# Patient Record
Sex: MALE | Race: WHITE | NOT HISPANIC OR LATINO | Employment: OTHER | URBAN - METROPOLITAN AREA
[De-identification: names, ages, dates, MRNs, and addresses within clinical notes are randomized per-mention and may not be internally consistent; named-entity substitution may affect disease eponyms.]

---

## 2017-02-28 ENCOUNTER — ALLSCRIPTS OFFICE VISIT (OUTPATIENT)
Dept: OTHER | Facility: OTHER | Age: 62
End: 2017-02-28

## 2017-02-28 LAB — S PYO AG THROAT QL: NEGATIVE

## 2017-06-06 ENCOUNTER — ALLSCRIPTS OFFICE VISIT (OUTPATIENT)
Dept: OTHER | Facility: OTHER | Age: 62
End: 2017-06-06

## 2017-06-06 DIAGNOSIS — Z12.5 ENCOUNTER FOR SCREENING FOR MALIGNANT NEOPLASM OF PROSTATE: ICD-10-CM

## 2017-06-06 DIAGNOSIS — M85.80 OTHER SPECIFIED DISORDERS OF BONE DENSITY AND STRUCTURE, UNSPECIFIED SITE: ICD-10-CM

## 2017-06-06 DIAGNOSIS — R06.09 OTHER FORMS OF DYSPNEA: ICD-10-CM

## 2017-06-06 DIAGNOSIS — J45.909 UNCOMPLICATED ASTHMA: ICD-10-CM

## 2017-06-06 DIAGNOSIS — Z13.220 ENCOUNTER FOR SCREENING FOR LIPOID DISORDERS: ICD-10-CM

## 2017-06-06 DIAGNOSIS — M54.2 CERVICALGIA: ICD-10-CM

## 2017-06-12 ENCOUNTER — TRANSCRIBE ORDERS (OUTPATIENT)
Dept: ADMINISTRATIVE | Facility: HOSPITAL | Age: 62
End: 2017-06-12

## 2017-06-12 DIAGNOSIS — J45.909 EXTRINSIC ASTHMA, UNSPECIFIED: ICD-10-CM

## 2017-06-12 DIAGNOSIS — R06.09 OTHER DYSPNEA AND RESPIRATORY ABNORMALITY: Primary | ICD-10-CM

## 2017-06-12 DIAGNOSIS — R09.89 OTHER DYSPNEA AND RESPIRATORY ABNORMALITY: Primary | ICD-10-CM

## 2017-06-21 ENCOUNTER — GENERIC CONVERSION - ENCOUNTER (OUTPATIENT)
Dept: OTHER | Facility: OTHER | Age: 62
End: 2017-06-21

## 2017-06-21 LAB
LDLC SERPL CALC-MCNC: 151 MG/DL
PSA (HISTORICAL): 1.3

## 2017-07-27 ENCOUNTER — ALLSCRIPTS OFFICE VISIT (OUTPATIENT)
Dept: OTHER | Facility: OTHER | Age: 62
End: 2017-07-27

## 2017-07-27 DIAGNOSIS — E78.2 MIXED HYPERLIPIDEMIA: ICD-10-CM

## 2018-01-12 VITALS
WEIGHT: 163.38 LBS | HEIGHT: 68 IN | SYSTOLIC BLOOD PRESSURE: 142 MMHG | BODY MASS INDEX: 24.76 KG/M2 | DIASTOLIC BLOOD PRESSURE: 96 MMHG | TEMPERATURE: 98.5 F | OXYGEN SATURATION: 95 % | HEART RATE: 74 BPM

## 2018-01-13 VITALS
HEIGHT: 68 IN | DIASTOLIC BLOOD PRESSURE: 80 MMHG | TEMPERATURE: 98.8 F | OXYGEN SATURATION: 98 % | HEART RATE: 82 BPM | SYSTOLIC BLOOD PRESSURE: 144 MMHG | WEIGHT: 166.38 LBS | BODY MASS INDEX: 25.22 KG/M2

## 2018-01-14 VITALS
TEMPERATURE: 98 F | OXYGEN SATURATION: 98 % | HEIGHT: 68 IN | WEIGHT: 165.6 LBS | SYSTOLIC BLOOD PRESSURE: 134 MMHG | HEART RATE: 69 BPM | DIASTOLIC BLOOD PRESSURE: 80 MMHG | BODY MASS INDEX: 25.1 KG/M2

## 2018-12-05 ENCOUNTER — OFFICE VISIT (OUTPATIENT)
Dept: INTERNAL MEDICINE CLINIC | Age: 63
End: 2018-12-05
Payer: COMMERCIAL

## 2018-12-05 VITALS
BODY MASS INDEX: 26.87 KG/M2 | SYSTOLIC BLOOD PRESSURE: 148 MMHG | HEART RATE: 89 BPM | HEIGHT: 66 IN | WEIGHT: 167.2 LBS | DIASTOLIC BLOOD PRESSURE: 88 MMHG | TEMPERATURE: 98.7 F | OXYGEN SATURATION: 96 %

## 2018-12-05 DIAGNOSIS — Z13.228 SCREENING FOR METABOLIC DISORDER: ICD-10-CM

## 2018-12-05 DIAGNOSIS — J45.21 MILD INTERMITTENT ASTHMA WITH EXACERBATION: ICD-10-CM

## 2018-12-05 DIAGNOSIS — Z11.59 ENCOUNTER FOR HEPATITIS C SCREENING TEST FOR LOW RISK PATIENT: ICD-10-CM

## 2018-12-05 DIAGNOSIS — J06.9 VIRAL URI WITH COUGH: Primary | ICD-10-CM

## 2018-12-05 PROBLEM — J45.901 ASTHMA EXACERBATION: Status: ACTIVE | Noted: 2018-12-05

## 2018-12-05 PROCEDURE — 99213 OFFICE O/P EST LOW 20 MIN: CPT | Performed by: NURSE PRACTITIONER

## 2018-12-05 RX ORDER — PROMETHAZINE HYDROCHLORIDE AND CODEINE PHOSPHATE 6.25; 1 MG/5ML; MG/5ML
5 SYRUP ORAL EVERY 4 HOURS PRN
Qty: 120 ML | Refills: 0 | Status: SHIPPED | OUTPATIENT
Start: 2018-12-05 | End: 2019-11-19

## 2018-12-05 RX ORDER — ALBUTEROL SULFATE 90 UG/1
1 AEROSOL, METERED RESPIRATORY (INHALATION) EVERY 4 HOURS PRN
Qty: 1 INHALER | Refills: 2 | Status: SHIPPED | OUTPATIENT
Start: 2018-12-05 | End: 2019-10-25 | Stop reason: SDUPTHER

## 2018-12-05 RX ORDER — ALBUTEROL SULFATE 90 UG/1
AEROSOL, METERED RESPIRATORY (INHALATION)
COMMUNITY
End: 2018-12-05 | Stop reason: SDUPTHER

## 2018-12-05 NOTE — PROGRESS NOTES
Assessment/Plan:    Asthma exacerbation   Will refill patient's albuterol as well as Advair, patient is to use inhalers as prescribed  Will also advised patient to use Flonase 2 sprays each nostril daily to help with postnasal drip  Viral URI with cough  Illness appears to be viral in nature  Rest and fluids advised  Educated that the course of this illness could be 2-4 weeks  Discussed symptomatic relief, such as warm steam inhalations, tylenol/ibuprofen for fevers and body aches, rest, and drink plenty of fluids  Warm salt gargles for sore throat  Discussed red flag signs to go to the ER, such as chest pain or shortness of breath  Return to the office for reevaluation if symptoms worsen or do not improve in 1-2 weeks    Will give patient promethazine with codeine to help with cough at night  Patient did have an elevation in his blood pressure in office today, advised patient not to take Mucinex DM as it may elevate his blood pressure  Diagnoses and all orders for this visit:    Viral URI with cough  -     fluticasone-salmeterol (ADVAIR DISKUS) 250-50 mcg/dose inhaler; Inhale 1 puff 2 (two) times a day  -     albuterol (PROAIR HFA) 90 mcg/act inhaler; Inhale 1 puff every 4 (four) hours as needed for wheezing or shortness of breath  -     promethazine-codeine (PHENERGAN WITH CODEINE) 6 25-10 mg/5 mL syrup; Take 5 mL by mouth every 4 (four) hours as needed for cough Take at bedtime for cough  Mild intermittent asthma with exacerbation  -     fluticasone-salmeterol (ADVAIR DISKUS) 250-50 mcg/dose inhaler; Inhale 1 puff 2 (two) times a day  -     albuterol (PROAIR HFA) 90 mcg/act inhaler; Inhale 1 puff every 4 (four) hours as needed for wheezing or shortness of breath    Screening for metabolic disorder  -     CBC and differential; Future  -     Comprehensive metabolic panel; Future  -     Lipid panel;  Future    Encounter for hepatitis C screening test for low risk patient  -     Hepatitis C antibody; Future    Other orders  -     Discontinue: fluticasone-salmeterol (ADVAIR DISKUS) 250-50 mcg/dose inhaler; Inhale 1 puff 2 (two) times a day  -     Discontinue: albuterol (PROAIR HFA) 90 mcg/act inhaler; Inhale          Subjective:      Patient ID: Angela Murphy is a 58 y o  male  57 yo male who presents to the office today complaining of worsening cold symptoms x3 days  He reports worsening cough, chest tightness, LAFLEUR, nasal congestion, rhinorrhea, and sinus pressure  He notes a hx of asthma and reports everytime he gets an illness this time of year it triggers his asthma  Pt reports taking robitussin-DM with no relief of sxs; he did take a dose 1 hour prior to arrival to an office  Pt is a former smoker, he quit in 1987  Pt also reports only having a few puffs left of both of his inhalers and requesting refills  Denies any previous hospitalizations in the past for his asthma  Denies any hx of intubations secondary to his asthma  Denies taking prednisone for any asthma flares in the past  He reports he does not like the way he feels when he takes the prednisone  Cough   This is a new problem  The current episode started in the past 7 days  The problem has been unchanged  The problem occurs every few minutes  The cough is non-productive (dry)  Associated symptoms include nasal congestion, postnasal drip, rhinorrhea, shortness of breath (with exertion) and wheezing  Pertinent negatives include no chest pain, chills, ear congestion, ear pain, fever, headaches, rash or sore throat  The symptoms are aggravated by cold air and lying down  Risk factors for lung disease include smoking/tobacco exposure  He has tried OTC cough suppressant and a beta-agonist inhaler (using his inhalers more than usual) for the symptoms  The treatment provided no relief  His past medical history is significant for asthma  There is no history of bronchitis, COPD or emphysema         The following portions of the patient's history were reviewed and updated as appropriate: allergies, current medications, past family history, past medical history, past social history, past surgical history and problem list     Review of Systems   Constitutional: Negative for chills, fatigue and fever  HENT: Positive for congestion, postnasal drip, rhinorrhea and sinus pressure  Negative for ear pain and sore throat  Respiratory: Positive for cough, shortness of breath (with exertion) and wheezing  Cardiovascular: Negative for chest pain  Gastrointestinal: Negative for abdominal pain, constipation, diarrhea, nausea and vomiting  Genitourinary: Negative for difficulty urinating, dysuria, frequency and hematuria  Musculoskeletal: Negative for arthralgias, back pain and neck pain  Skin: Negative for rash and wound  Neurological: Negative for dizziness, syncope, light-headedness and headaches  Psychiatric/Behavioral: Positive for sleep disturbance (secondary to sxs)  Negative for agitation and decreased concentration  The patient is not nervous/anxious  All other systems reviewed and are negative  Past Medical History:   Diagnosis Date    Acute asthmatic bronchitis     Acute frontal sinusitis     History of acute pharyngitis     History of allergy     History of osteopenia     History of right inguinal hernia     History of seborrheic keratosis     History of umbilical hernia     Preop examination          Current Outpatient Prescriptions:     albuterol (PROAIR HFA) 90 mcg/act inhaler, Inhale 1 puff every 4 (four) hours as needed for wheezing or shortness of breath, Disp: 1 Inhaler, Rfl: 2    fluticasone-salmeterol (ADVAIR DISKUS) 250-50 mcg/dose inhaler, Inhale 1 puff 2 (two) times a day, Disp: 1 Inhaler, Rfl: 2    promethazine-codeine (PHENERGAN WITH CODEINE) 6 25-10 mg/5 mL syrup, Take 5 mL by mouth every 4 (four) hours as needed for cough Take at bedtime for cough  , Disp: 120 mL, Rfl: 0    Allergies   Allergen Reactions    Erythromycin GI Intolerance    Pollen Extract        Social History   Past Surgical History:   Procedure Laterality Date    APPENDECTOMY      INGUINAL HERNIA REPAIR      UMBILICAL HERNIA REPAIR       Family History   Problem Relation Age of Onset    Cancer Father         Adenoid Cystic Carcinoma    Heart disease Family     Multiple myeloma Mother     Heart disease Maternal Grandfather        Objective:  /88 (BP Location: Left arm, Patient Position: Sitting, Cuff Size: Adult)   Pulse 89   Temp 98 7 °F (37 1 °C) (Tympanic)   Ht 5' 6 14" (1 68 m)   Wt 75 8 kg (167 lb 3 2 oz)   SpO2 96%   BMI 26 87 kg/m²     No results found for this or any previous visit (from the past 1344 hour(s))  Physical Exam   Constitutional: He is oriented to person, place, and time  He appears well-developed and well-nourished  No distress  HENT:   Head: Normocephalic and atraumatic  Right Ear: External ear normal    Left Ear: External ear normal    Nose: Mucosal edema and rhinorrhea present  Right sinus exhibits no maxillary sinus tenderness and no frontal sinus tenderness  Left sinus exhibits no maxillary sinus tenderness and no frontal sinus tenderness  Mouth/Throat: Mucous membranes are pale and dry  Posterior oropharyngeal erythema present  No oropharyngeal exudate or posterior oropharyngeal edema  Unable to visualize tympanic membranes as patient has cerumen impaction bilateral ears   Eyes: Pupils are equal, round, and reactive to light  Conjunctivae and EOM are normal  Right eye exhibits no discharge  Left eye exhibits no discharge  Neck: Normal range of motion  Neck supple  No thyromegaly present  Cardiovascular: Normal rate, regular rhythm, normal heart sounds and intact distal pulses  Exam reveals no gallop and no friction rub  No murmur heard  Pulmonary/Chest: Effort normal  No stridor  No respiratory distress   He has decreased breath sounds in the right upper field and the left upper field  He has no wheezes  He has no rales  Abdominal: Soft  Bowel sounds are normal  He exhibits no distension  There is no tenderness  Lymphadenopathy:     He has no cervical adenopathy  Neurological: He is alert and oriented to person, place, and time  Skin: Skin is warm and dry  No rash noted  He is not diaphoretic  No erythema  Psychiatric: He has a normal mood and affect   His behavior is normal  Judgment and thought content normal

## 2018-12-05 NOTE — ASSESSMENT & PLAN NOTE
Illness appears to be viral in nature  Rest and fluids advised  Educated that the course of this illness could be 2-4 weeks  Discussed symptomatic relief, such as warm steam inhalations, tylenol/ibuprofen for fevers and body aches, rest, and drink plenty of fluids  Warm salt gargles for sore throat  Discussed red flag signs to go to the ER, such as chest pain or shortness of breath  Return to the office for reevaluation if symptoms worsen or do not improve in 1-2 weeks    Will give patient promethazine with codeine to help with cough at night  Patient did have an elevation in his blood pressure in office today, advised patient not to take Mucinex DM as it may elevate his blood pressure

## 2018-12-05 NOTE — ASSESSMENT & PLAN NOTE
Will refill patient's albuterol as well as Advair, patient is to use inhalers as prescribed  Will also advised patient to use Flonase 2 sprays each nostril daily to help with postnasal drip

## 2019-02-06 LAB
ALBUMIN SERPL-MCNC: 4.4 G/DL (ref 3.6–5.1)
ALBUMIN/GLOB SERPL: 1.8 (CALC) (ref 1–2.5)
ALP SERPL-CCNC: 64 U/L (ref 40–115)
ALT SERPL-CCNC: 14 U/L (ref 9–46)
AST SERPL-CCNC: 14 U/L (ref 10–35)
BASOPHILS # BLD AUTO: 68 CELLS/UL (ref 0–200)
BASOPHILS NFR BLD AUTO: 0.9 %
BILIRUB SERPL-MCNC: 0.6 MG/DL (ref 0.2–1.2)
BUN SERPL-MCNC: 16 MG/DL (ref 7–25)
BUN/CREAT SERPL: ABNORMAL (CALC) (ref 6–22)
CALCIUM SERPL-MCNC: 9.9 MG/DL (ref 8.6–10.3)
CHLORIDE SERPL-SCNC: 103 MMOL/L (ref 98–110)
CHOLEST SERPL-MCNC: 246 MG/DL
CHOLEST/HDLC SERPL: 5.3 (CALC)
CO2 SERPL-SCNC: 30 MMOL/L (ref 20–32)
CREAT SERPL-MCNC: 1.09 MG/DL (ref 0.7–1.25)
EOSINOPHIL # BLD AUTO: 360 CELLS/UL (ref 15–500)
EOSINOPHIL NFR BLD AUTO: 4.8 %
ERYTHROCYTE [DISTWIDTH] IN BLOOD BY AUTOMATED COUNT: 12.4 % (ref 11–15)
GLOBULIN SER CALC-MCNC: 2.4 G/DL (CALC) (ref 1.9–3.7)
GLUCOSE SERPL-MCNC: 102 MG/DL (ref 65–99)
HCT VFR BLD AUTO: 52.7 % (ref 38.5–50)
HCV AB S/CO SERPL IA: 0.01
HCV AB SERPL QL IA: NORMAL
HDLC SERPL-MCNC: 46 MG/DL
HGB BLD-MCNC: 17.6 G/DL (ref 13.2–17.1)
LDLC SERPL CALC-MCNC: 165 MG/DL (CALC)
LYMPHOCYTES # BLD AUTO: 2333 CELLS/UL (ref 850–3900)
LYMPHOCYTES NFR BLD AUTO: 31.1 %
MCH RBC QN AUTO: 30.1 PG (ref 27–33)
MCHC RBC AUTO-ENTMCNC: 33.4 G/DL (ref 32–36)
MCV RBC AUTO: 90.2 FL (ref 80–100)
MONOCYTES # BLD AUTO: 623 CELLS/UL (ref 200–950)
MONOCYTES NFR BLD AUTO: 8.3 %
NEUTROPHILS # BLD AUTO: 4118 CELLS/UL (ref 1500–7800)
NEUTROPHILS NFR BLD AUTO: 54.9 %
NONHDLC SERPL-MCNC: 200 MG/DL (CALC)
PLATELET # BLD AUTO: 289 THOUSAND/UL (ref 140–400)
PMV BLD REES-ECKER: 9.5 FL (ref 7.5–12.5)
POTASSIUM SERPL-SCNC: 5.2 MMOL/L (ref 3.5–5.3)
PROT SERPL-MCNC: 6.8 G/DL (ref 6.1–8.1)
RBC # BLD AUTO: 5.84 MILLION/UL (ref 4.2–5.8)
SL AMB EGFR AFRICAN AMERICAN: 83 ML/MIN/1.73M2
SL AMB EGFR NON AFRICAN AMERICAN: 71 ML/MIN/1.73M2
SODIUM SERPL-SCNC: 141 MMOL/L (ref 135–146)
TRIGL SERPL-MCNC: 195 MG/DL
WBC # BLD AUTO: 7.5 THOUSAND/UL (ref 3.8–10.8)

## 2019-07-09 DIAGNOSIS — J06.9 VIRAL URI WITH COUGH: ICD-10-CM

## 2019-07-09 DIAGNOSIS — J45.21 MILD INTERMITTENT ASTHMA WITH EXACERBATION: ICD-10-CM

## 2019-07-09 NOTE — TELEPHONE ENCOUNTER
MED:  ADVAIR 250-50g  SUPPLY: 90Day  PHARAMCY: [id number]  PATIENT PHONE #: [phone]  LAST OV: 1/26/2018  UPCOMING: [facility] for the patient to call back and reschedule archel

## 2019-10-25 DIAGNOSIS — J45.21 MILD INTERMITTENT ASTHMA WITH EXACERBATION: ICD-10-CM

## 2019-10-25 DIAGNOSIS — J06.9 VIRAL URI WITH COUGH: ICD-10-CM

## 2019-10-25 RX ORDER — ALBUTEROL SULFATE 90 UG/1
1 AEROSOL, METERED RESPIRATORY (INHALATION) EVERY 4 HOURS PRN
Qty: 18 G | Refills: 1 | Status: SHIPPED | OUTPATIENT
Start: 2019-10-25 | End: 2019-11-19 | Stop reason: SDUPTHER

## 2019-11-19 ENCOUNTER — OFFICE VISIT (OUTPATIENT)
Dept: INTERNAL MEDICINE CLINIC | Facility: CLINIC | Age: 64
End: 2019-11-19
Payer: MEDICARE

## 2019-11-19 VITALS
OXYGEN SATURATION: 97 % | HEART RATE: 82 BPM | TEMPERATURE: 98.4 F | HEIGHT: 66 IN | SYSTOLIC BLOOD PRESSURE: 150 MMHG | WEIGHT: 167.6 LBS | DIASTOLIC BLOOD PRESSURE: 82 MMHG | BODY MASS INDEX: 26.93 KG/M2

## 2019-11-19 DIAGNOSIS — J45.20 MILD INTERMITTENT ASTHMA WITHOUT COMPLICATION: ICD-10-CM

## 2019-11-19 DIAGNOSIS — Z13.220 SCREENING CHOLESTEROL LEVEL: ICD-10-CM

## 2019-11-19 DIAGNOSIS — Z12.11 ENCOUNTER FOR SCREENING COLONOSCOPY: Primary | ICD-10-CM

## 2019-11-19 DIAGNOSIS — E78.00 HYPERCHOLESTEREMIA: ICD-10-CM

## 2019-11-19 DIAGNOSIS — Z00.00 WELCOME TO MEDICARE PREVENTIVE VISIT: ICD-10-CM

## 2019-11-19 DIAGNOSIS — J45.21 MILD INTERMITTENT ASTHMA WITH EXACERBATION: ICD-10-CM

## 2019-11-19 DIAGNOSIS — Z13.228 SCREENING FOR METABOLIC DISORDER: ICD-10-CM

## 2019-11-19 DIAGNOSIS — Z12.5 SCREENING PSA (PROSTATE SPECIFIC ANTIGEN): ICD-10-CM

## 2019-11-19 DIAGNOSIS — J30.2 SEASONAL ALLERGIES: ICD-10-CM

## 2019-11-19 DIAGNOSIS — J06.9 VIRAL URI WITH COUGH: ICD-10-CM

## 2019-11-19 DIAGNOSIS — Z12.11 SCREENING FOR COLON CANCER: ICD-10-CM

## 2019-11-19 PROBLEM — J45.901 ASTHMA EXACERBATION: Status: RESOLVED | Noted: 2018-12-05 | Resolved: 2019-11-19

## 2019-11-19 PROCEDURE — G0403 EKG FOR INITIAL PREVENT EXAM: HCPCS | Performed by: FAMILY MEDICINE

## 2019-11-19 PROCEDURE — 99214 OFFICE O/P EST MOD 30 MIN: CPT | Performed by: FAMILY MEDICINE

## 2019-11-19 PROCEDURE — G0402 INITIAL PREVENTIVE EXAM: HCPCS | Performed by: FAMILY MEDICINE

## 2019-11-19 RX ORDER — ALBUTEROL SULFATE 90 UG/1
1 AEROSOL, METERED RESPIRATORY (INHALATION) EVERY 6 HOURS PRN
Qty: 18 G | Refills: 3 | Status: SHIPPED | OUTPATIENT
Start: 2019-11-19 | End: 2021-01-28 | Stop reason: SDUPTHER

## 2019-11-19 RX ORDER — MONTELUKAST SODIUM 10 MG/1
10 TABLET ORAL
Qty: 90 TABLET | Refills: 3 | Status: SHIPPED | OUTPATIENT
Start: 2019-11-19 | End: 2021-01-28

## 2019-11-19 NOTE — PROGRESS NOTES
Assessment/Plan:    No problem-specific Assessment & Plan notes found for this encounter  Problem List Items Addressed This Visit        Respiratory    Mild intermittent asthma without complication    Relevant Medications    fluticasone-salmeterol (ADVAIR DISKUS) 250-50 mcg/dose inhaler    albuterol (PROAIR HFA) 90 mcg/act inhaler    montelukast (SINGULAIR) 10 mg tablet    RESOLVED: Asthma exacerbation    Relevant Medications    fluticasone-salmeterol (ADVAIR DISKUS) 250-50 mcg/dose inhaler    albuterol (PROAIR HFA) 90 mcg/act inhaler    montelukast (SINGULAIR) 10 mg tablet       Other    Screening for metabolic disorder    Relevant Orders    Comprehensive metabolic panel    Lipid panel    Welcome to Medicare preventive visit    Relevant Orders    POCT ECG (Completed)    Comprehensive metabolic panel    Lipid panel    Seasonal allergies      Other Visit Diagnoses     Encounter for screening colonoscopy    -  Primary    Screening for colon cancer        Relevant Orders    Cologuard    Screening PSA (prostate specific antigen)        Relevant Orders    PSA, Total Screen    Screening cholesterol level        Relevant Orders    Comprehensive metabolic panel    Lipid panel    Viral URI with cough        Relevant Medications    fluticasone-salmeterol (ADVAIR DISKUS) 250-50 mcg/dose inhaler    albuterol (PROAIR HFA) 90 mcg/act inhaler    Hypercholesteremia        Relevant Orders    Lipid panel    Comprehensive metabolic panel            Subjective:      Patient ID: Magdalena Russo is a 58 y o  male  HPI     Seasonal allergies, had several outdoor allergies as a child and received injections for several years which did help him  Has been on Singulair in the past but is not sure why he is not on it right now  He did tolerated well  Not taking any over-the-counter allergy medications on a regular basis    Asthma, intermittent, has some wheezing more so from his throat on a regular basis    Only uses Advair once a day even though he knows he is supposed to do it twice a day  Not on antihistamine or mast cell stabilizer  Uses albuterol inhaler every single morning once a day  He is very energetic and works long hours in [employer] and also goes to [employer] and walks as exercise  He does not have baseline shortness of breath  Hyperlipidemia, currently not on medications  His cholesterol does fluctuate from time to time  He does have family history but he eats very well and needs approximately 5-6 very small meals throughout the day on a regular basis            The following portions of the patient's history were reviewed and updated as appropriate: allergies, current medications, past family history, past medical history, past social history, past surgical history and problem list     Review of Systems      Constitutional:  Denies fever or chills   Eyes:  Denies change in visual acuity   HENT:  Denies nasal congestion or sore throat   Respiratory:  Denies cough or shortness of breath or wheezing  Cardiovascular:  Denies palpitations or chest pain  GI:  Denies abdominal pain, nausea, or vomiting  Integument:  Denies rash   Neurologic:  Denies headache or focal weakness        Objective:      /82 (BP Location: Left arm, Patient Position: Sitting, Cuff Size: Large)   Pulse 82   Temp 98 4 °F (36 9 °C) (Oral)   Ht 5' 6 14" (1 68 m)   Wt 76 kg (167 lb 9 6 oz)   SpO2 97%   BMI 26 94 kg/m²          Physical Exam      Constitutional:  Well developed, well nourished, no acute distress, non-toxic appearance   Eyes:  PERRL, conjunctiva normal , non icteric sclera  HENT:  Atraumatic, oropharynx moist  Neck-  supple   Respiratory:  CTA b/l, normal breath sounds, no rales, no wheezing   Cardiovascular:  RRR, no murmurs, no LE edema b/l  GI:  Soft, nondistended, normal bowel sounds x 4, nontender, no organomegaly, no mass, no rebound, no guarding   Neurologic:  no focal deficits noted   Psychiatric:  Speech and behavior appropriate , AAO x 3

## 2019-11-19 NOTE — PROGRESS NOTES
Assessment and Plan:     Problem List Items Addressed This Visit        Other    Screening for metabolic disorder    Relevant Orders    Comprehensive metabolic panel    Lipid panel    Welcome to Medicare preventive visit    Relevant Orders    POCT ECG (Completed)    Comprehensive metabolic panel    Lipid panel      Other Visit Diagnoses     Encounter for screening colonoscopy    -  Primary    Screening for colon cancer        Screening PSA (prostate specific antigen)        Relevant Orders    PSA, Total Screen    Screening cholesterol level        Relevant Orders    Comprehensive metabolic panel    Lipid panel          Preventive health issues were discussed with patient, and age appropriate screening tests were ordered as noted in patient's After Visit Summary  Personalized health advice and appropriate referrals for health education or preventive services given if needed, as noted in patient's After Visit Summary  BMI Counseling: Body mass index is 26 94 kg/m²  The BMI is above normal  Nutrition recommendations include moderation in carbohydrate intake  Exercise recommendations include exercising 3-5 times per week  No pharmacotherapy was ordered  History of Present Illness:     Patient presents for Welcome to Medicare visit       Patient Care Team:  Roxanna Ro DO as PCP - General  NITA Nichole     Review of Systems:     Review of Systems   Problem List:     Patient Active Problem List   Diagnosis    Asthma exacerbation    Screening for metabolic disorder    Encounter for hepatitis C screening test for low risk patient    Welcome to Medicare preventive visit      Past Medical and Surgical History:     Past Medical History:   Diagnosis Date    Acute asthmatic bronchitis     Acute frontal sinusitis     History of acute pharyngitis     History of allergy     History of osteopenia     History of right inguinal hernia     History of seborrheic keratosis     History of umbilical hernia     Preop examination      Past Surgical History:   Procedure Laterality Date    APPENDECTOMY      INGUINAL HERNIA REPAIR      UMBILICAL HERNIA REPAIR        Family History:     Family History   Problem Relation Age of Onset    Cancer Father         Adenoid Cystic Carcinoma    Heart disease Family     Multiple myeloma Mother     Heart disease Maternal Grandfather       Social History:     Social History     Socioeconomic History    Marital status: /Civil Union     Spouse name: None    Number of children: None    Years of education: None    Highest education level: None   Occupational History    None   Social Needs    Financial resource strain: None    Food insecurity:     Worry: None     Inability: None    Transportation needs:     Medical: None     Non-medical: None   Tobacco Use    Smoking status: Former Smoker     Last attempt to quit:      Years since quittin 9    Smokeless tobacco: Never Used   Substance and Sexual Activity    Alcohol use: Yes     Comment: rare    Drug use: No     Comment: Rarely consumes alcohol    Sexual activity: Yes   Lifestyle    Physical activity:     Days per week: None     Minutes per session: None    Stress: None   Relationships    Social connections:     Talks on phone: None     Gets together: None     Attends Cheondo[employer] service: None     Active member of club or organization: None     Attends meetings of clubs or organizations: None     Relationship status: None    Intimate partner violence:     Fear of current or ex partner: None     Emotionally abused: None     Physically abused: None     Forced sexual activity: None   Other Topics Concern    None   Social History Narrative    None      Medications and Allergies:     Current Outpatient Medications   Medication Sig Dispense Refill    albuterol (PROAIR HFA) 90 mcg/act inhaler Inhale 1 puff every 4 (four) hours as needed for wheezing or shortness of breath 18 g 1    fluticasone-salmeterol (ADVAIR DISKUS) 250-50 mcg/dose inhaler Inhale 1 puff 2 (two) times a day 60 each 5     No current facility-administered medications for this visit  Allergies   Allergen Reactions    Erythromycin GI Intolerance    Pollen Extract       Immunizations:     Immunization History   Administered Date(s) Administered    Influenza Quadrivalent, 6-35 Months IM 02/16/2016      Health Maintenance:         Topic Date Due    CRC Screening: Colonoscopy  12/24/2021    Hepatitis C Screening  Completed         Topic Date Due    Pneumococcal Vaccine: Pediatrics (0 to 5 Years) and At-Risk Patients (6 to 59 Years) (1 of 1 - PPSV23) [birth year]/1960    DTaP,Tdap,and Td Vaccines (1 - Tdap) 11/23/1965    Influenza Vaccine  07/01/2019      Medicare Screening Tests and Risk Assessments:     Mathieu Avila is here for his Welcome to Medicare visit  Health Risk Assessment:   Patient rates overall health as very good  Patient feels that their physical health rating is same  Eyesight was rated as same  Hearing was rated as same  Patient feels that their emotional and mental health rating is same  Pain experienced in the last 7 days has been some  Patient's pain rating has been 3/10  Depression Screening:   PHQ-2 Score: 0      Fall Risk Screening: In the past year, patient has experienced: no history of falling in past year      Home Safety:  Patient does not have trouble with stairs inside or outside of their home  Patient has working smoke alarms and has working carbon monoxide detector  Home safety hazards include: none  Nutrition:   Current diet is Regular  Medications:   Patient is currently taking over-the-counter supplements  OTC medications include: see medication list  Patient is able to manage medications       Activities of Daily Living (ADLs)/Instrumental Activities of Daily Living (IADLs):   Walk and transfer into and out of bed and chair?: Yes  Dress and groom yourself?: Yes    Bathe or shower yourself?: Yes    Feed yourself? Yes  Do your laundry/housekeeping?: Yes  Manage your money, pay your bills and track your expenses?: Yes  Make your own meals?: Yes    Do your own shopping?: Yes    Previous Hospitalizations:   Any hospitalizations or ED visits within the last 12 months?: No      Advance Care Planning:   Living will: Yes    Durable POA for healthcare: Yes    Advanced directive: Yes    Advanced directive counseling given: Yes    End of Life Decisions reviewed with patient: Yes    Provider agrees with end of life decisions: Yes      Comments: Wife Millie Damon    Cognitive Screening:   Provider or family/friend/caregiver concerned regarding cognition?: No    PREVENTIVE SCREENINGS      Cardiovascular Screening:    General: Screening Current      Diabetes Screening:     General: Screening Current      Colorectal Cancer Screening:     General: Screening Current      Prostate Cancer Screening:    General: Risks and Benefits Discussed    Due for: PSA      Osteoporosis Screening:    General: Screening Not Indicated      Abdominal Aortic Aneurysm (AAA) Screening:    Risk factors include: tobacco use        Lung Cancer Screening:     General: Screening Not Indicated      Hepatitis C Screening:    General: Screening Current    Other Counseling Topics:   Alcohol use counseling, car/seat belt/driving safety, skin self-exam, sunscreen and regular weightbearing exercise and calcium and vitamin D intake        Visual Acuity Screening    Right eye Left eye Both eyes   Without correction: 50/50 20/40 20/40   With correction:           Physical Exam:     /82 (BP Location: Left arm, Patient Position: Sitting, Cuff Size: Large)   Pulse 82   Temp 98 4 °F (36 9 °C) (Oral)   Ht 5' 6 14" (1 68 m)   Wt 76 kg (167 lb 9 6 oz)   SpO2 97%   BMI 26 94 kg/m²     Physical Exam     Constitutional:  Well developed, well nourished, no acute distress, non-toxic appearance   Eyes:  PERRL, conjunctiva normal , non icteric sclera  HENT:  Atraumatic, oropharynx moist  Neck-  supple   Respiratory:  CTA b/l, normal breath sounds, no rales, no wheezing   Cardiovascular:  RRR, no murmurs, no LE edema b/l  GI:  Soft, nondistended, normal bowel sounds x 4, nontender, no organomegaly, no mass, no rebound, no guarding   Neurologic:  no focal deficits noted   Psychiatric:  Speech and behavior appropriate , AAO x 3    EKG: unchanged from previous tracings        Bobbette Fall, DO

## 2019-11-19 NOTE — PATIENT INSTRUCTIONS

## 2020-01-07 ENCOUNTER — TELEPHONE (OUTPATIENT)
Dept: INTERNAL MEDICINE CLINIC | Facility: CLINIC | Age: 65
End: 2020-01-07

## 2020-11-23 ENCOUNTER — TELEPHONE (OUTPATIENT)
Dept: INTERNAL MEDICINE CLINIC | Age: 65
End: 2020-11-23

## 2020-12-21 DIAGNOSIS — Z13.228 SCREENING FOR METABOLIC DISORDER: ICD-10-CM

## 2020-12-21 DIAGNOSIS — Z12.5 SCREENING PSA (PROSTATE SPECIFIC ANTIGEN): Primary | ICD-10-CM

## 2020-12-21 DIAGNOSIS — E78.00 HYPERCHOLESTEREMIA: ICD-10-CM

## 2020-12-21 DIAGNOSIS — Z13.220 SCREENING CHOLESTEROL LEVEL: ICD-10-CM

## 2021-01-05 ENCOUNTER — LAB (OUTPATIENT)
Dept: LAB | Age: 66
End: 2021-01-05
Payer: MEDICARE

## 2021-01-05 DIAGNOSIS — Z13.228 SCREENING FOR METABOLIC DISORDER: ICD-10-CM

## 2021-01-05 DIAGNOSIS — Z13.220 SCREENING CHOLESTEROL LEVEL: ICD-10-CM

## 2021-01-05 DIAGNOSIS — E78.00 HYPERCHOLESTEREMIA: ICD-10-CM

## 2021-01-05 DIAGNOSIS — Z12.5 SCREENING PSA (PROSTATE SPECIFIC ANTIGEN): ICD-10-CM

## 2021-01-05 LAB
ALBUMIN SERPL BCP-MCNC: 3.8 G/DL (ref 3.5–5)
ALP SERPL-CCNC: 61 U/L (ref 46–116)
ALT SERPL W P-5'-P-CCNC: 28 U/L (ref 12–78)
ANION GAP SERPL CALCULATED.3IONS-SCNC: 2 MMOL/L (ref 4–13)
AST SERPL W P-5'-P-CCNC: 14 U/L (ref 5–45)
BILIRUB SERPL-MCNC: 0.62 MG/DL (ref 0.2–1)
BUN SERPL-MCNC: 16 MG/DL (ref 5–25)
CALCIUM SERPL-MCNC: 8.5 MG/DL (ref 8.3–10.1)
CHLORIDE SERPL-SCNC: 106 MMOL/L (ref 100–108)
CHOLEST SERPL-MCNC: 274 MG/DL (ref 50–200)
CO2 SERPL-SCNC: 32 MMOL/L (ref 21–32)
CREAT SERPL-MCNC: 1.08 MG/DL (ref 0.6–1.3)
GFR SERPL CREATININE-BSD FRML MDRD: 71 ML/MIN/1.73SQ M
GLUCOSE P FAST SERPL-MCNC: 77 MG/DL (ref 65–99)
HDLC SERPL-MCNC: 54 MG/DL
LDLC SERPL CALC-MCNC: 185 MG/DL (ref 0–100)
NONHDLC SERPL-MCNC: 220 MG/DL
POTASSIUM SERPL-SCNC: 4.1 MMOL/L (ref 3.5–5.3)
PROT SERPL-MCNC: 6.9 G/DL (ref 6.4–8.2)
PSA SERPL-MCNC: 1.8 NG/ML (ref 0–4)
SODIUM SERPL-SCNC: 140 MMOL/L (ref 136–145)
TRIGL SERPL-MCNC: 177 MG/DL

## 2021-01-05 PROCEDURE — 36415 COLL VENOUS BLD VENIPUNCTURE: CPT

## 2021-01-05 PROCEDURE — G0103 PSA SCREENING: HCPCS

## 2021-01-05 PROCEDURE — 80061 LIPID PANEL: CPT

## 2021-01-05 PROCEDURE — 80053 COMPREHEN METABOLIC PANEL: CPT

## 2021-01-28 ENCOUNTER — OFFICE VISIT (OUTPATIENT)
Dept: INTERNAL MEDICINE CLINIC | Age: 66
End: 2021-01-28
Payer: MEDICARE

## 2021-01-28 VITALS
TEMPERATURE: 99.1 F | HEART RATE: 97 BPM | OXYGEN SATURATION: 96 % | BODY MASS INDEX: 26.4 KG/M2 | HEIGHT: 67 IN | SYSTOLIC BLOOD PRESSURE: 160 MMHG | WEIGHT: 168.2 LBS | DIASTOLIC BLOOD PRESSURE: 80 MMHG

## 2021-01-28 DIAGNOSIS — Z00.00 MEDICARE ANNUAL WELLNESS VISIT, INITIAL: ICD-10-CM

## 2021-01-28 DIAGNOSIS — E78.2 MIXED HYPERLIPIDEMIA: ICD-10-CM

## 2021-01-28 DIAGNOSIS — L30.9 DERMATITIS: ICD-10-CM

## 2021-01-28 DIAGNOSIS — M94.9 DISORDER OF CARTILAGE, UNSPECIFIED: ICD-10-CM

## 2021-01-28 DIAGNOSIS — L81.9 ATYPICAL PIGMENTED SKIN LESION: ICD-10-CM

## 2021-01-28 DIAGNOSIS — Z82.49 FAMILY HISTORY OF EARLY CAD: ICD-10-CM

## 2021-01-28 DIAGNOSIS — R06.00 DOE (DYSPNEA ON EXERTION): ICD-10-CM

## 2021-01-28 DIAGNOSIS — J45.20 MILD INTERMITTENT ASTHMA WITHOUT COMPLICATION: Primary | ICD-10-CM

## 2021-01-28 DIAGNOSIS — R94.31 ABNORMAL ELECTROCARDIOGRAM (ECG) (EKG): ICD-10-CM

## 2021-01-28 DIAGNOSIS — M85.89 OSTEOPENIA OF MULTIPLE SITES: ICD-10-CM

## 2021-01-28 PROBLEM — R06.09 DOE (DYSPNEA ON EXERTION): Status: ACTIVE | Noted: 2021-01-28

## 2021-01-28 PROCEDURE — 1123F ACP DISCUSS/DSCN MKR DOCD: CPT | Performed by: FAMILY MEDICINE

## 2021-01-28 PROCEDURE — 99214 OFFICE O/P EST MOD 30 MIN: CPT | Performed by: FAMILY MEDICINE

## 2021-01-28 PROCEDURE — G0438 PPPS, INITIAL VISIT: HCPCS | Performed by: FAMILY MEDICINE

## 2021-01-28 RX ORDER — TRIAMCINOLONE ACETONIDE 5 MG/G
CREAM TOPICAL 2 TIMES DAILY PRN
Qty: 30 G | Refills: 4 | Status: SHIPPED | OUTPATIENT
Start: 2021-01-28 | End: 2021-12-07

## 2021-01-28 RX ORDER — DOXYCYCLINE HYCLATE 100 MG
100 TABLET ORAL DAILY
Qty: 10 TABLET | Refills: 0 | Status: SHIPPED | OUTPATIENT
Start: 2021-01-28 | End: 2021-02-07

## 2021-01-28 RX ORDER — ALBUTEROL SULFATE 90 UG/1
1 AEROSOL, METERED RESPIRATORY (INHALATION) EVERY 6 HOURS PRN
Qty: 18 G | Refills: 0 | Status: SHIPPED | OUTPATIENT
Start: 2021-01-28 | End: 2021-07-08 | Stop reason: SDUPTHER

## 2021-01-28 NOTE — PROGRESS NOTES
Assessment/Plan:    Mild intermittent asthma without complication  Well controlled  Pt uses albuterol once in the morning and once at night daily and advair once a day  Labs reviewed with patient  Schedule appt for biopsies  Pt refuses statin, agreeable for fish oil  He states he will do diet changes  Repeat lab sin 6months       Problem List Items Addressed This Visit        Respiratory    Mild intermittent asthma without complication - Primary     Well controlled  Pt uses albuterol once in the morning and once at night daily and advair once a day  Relevant Medications    albuterol (ProAir HFA) 90 mcg/act inhaler    fluticasone-salmeterol (Advair Diskus) 250-50 mcg/dose inhaler       Musculoskeletal and Integument    Dermatitis    Relevant Medications    doxycycline hyclate (VIBRA-TABS) 100 mg tablet    triamcinolone (KENALOG) 0 5 % cream    Atypical pigmented skin lesion    Relevant Medications    triamcinolone (KENALOG) 0 5 % cream       Other    Medicare annual wellness visit, initial    Mixed hyperlipidemia    Relevant Orders    Lipid panel    Comprehensive metabolic panel    LAFLEUR (dyspnea on exertion)    Relevant Orders    Complete PFT with post bronchodilator    NM myocardial perfusion spect (stress and/or rest)    Family history of early CAD    Relevant Orders    NM myocardial perfusion spect (stress and/or rest)      Other Visit Diagnoses     Osteopenia of multiple sites        Relevant Orders    DXA bone density spine hip and pelvis    Vitamin D 25 hydroxy    Disorder of cartilage, unspecified         Relevant Orders    Vitamin D 25 hydroxy    Abnormal electrocardiogram (ECG) (EKG)         Relevant Orders    NM myocardial perfusion spect (stress and/or rest)            Subjective:      Patient ID: Negro Pat is a 76 y o  male  HPI  76year old male presenting for follow up and annual medicare wellness visit  Pt c/o of rash on back x2 weeks   Pt states it is itchy and red - he thought it was acne at first but it hasn't responded to his usual treatment of benzoyl peroxide and alcohol  Pt states it is spreading from his back onto his shoulders now  Pt denies any chest pain, SOB, wheezing, abdominal pain, n/v/d, paresthesias, numbness, extremity edema  Pt states his asthma is under control with his current medication regimen  Pt is aware of his elevated cholesterol, triglycerides, and LDL and attributes it to eating too many baked goods over the holiday season  He states he wants to retake the labs in a few months rather than start any medications for it  Asthma, intermittent, has some wheezing more so from his throat on a regular basis  Only uses Advair once a day even though he knows he is supposed to do it twice a day  Not on antihistamine or mast cell stabilizer  Uses albuterol inhaler every single morning once a day  He is very energetic and works long hours in [employer] and also goes to [employer] and walks as exercise  He does not have baseline shortness of breath      Hyperlipidemia, currently not on medications  His cholesterol does fluctuate from time to time  He does have family history but he eats very well and needs approximately 5-6 very small meals throughout the day on a regular basis  The following portions of the patient's history were reviewed and updated as appropriate: allergies, current medications, past family history, past medical history, past social history, past surgical history and problem list     Review of Systems   Constitutional: Negative for chills, fatigue and fever  HENT: Negative for congestion, ear pain, nosebleeds, rhinorrhea and sore throat  Eyes: Negative for pain and visual disturbance  Respiratory: Negative for cough, chest tightness and shortness of breath  Cardiovascular: Negative for chest pain, palpitations and leg swelling  Gastrointestinal: Negative for abdominal pain, constipation, diarrhea, nausea and vomiting     Endocrine: Negative for polyuria  Genitourinary: Negative for dysuria, flank pain, frequency and hematuria  Musculoskeletal: Positive for arthralgias and myalgias  Skin: Positive for rash (on back x2 weeks,itching, redness)  Neurological: Negative for dizziness, tremors, syncope, weakness, light-headedness, numbness and headaches  Psychiatric/Behavioral: Negative for confusion  Objective:      /80 (BP Location: Left arm, Patient Position: Sitting, Cuff Size: Adult)   Pulse 97   Temp 99 1 °F (37 3 °C) (Temporal)   Ht 5' 7 32" (1 71 m)   Wt 76 3 kg (168 lb 3 2 oz)   SpO2 96%   BMI 26 09 kg/m²          Physical Exam  Constitutional:       Appearance: Normal appearance  HENT:      Head: Normocephalic and atraumatic  Eyes:      Extraocular Movements: Extraocular movements intact  Conjunctiva/sclera: Conjunctivae normal       Pupils: Pupils are equal, round, and reactive to light  Neck:      Musculoskeletal: Normal range of motion and neck supple  Cardiovascular:      Rate and Rhythm: Normal rate and regular rhythm  Pulses: Normal pulses  Heart sounds: Normal heart sounds  Pulmonary:      Effort: Pulmonary effort is normal       Breath sounds: Normal breath sounds  Abdominal:      General: Bowel sounds are normal  There is no distension  Palpations: Abdomen is soft  Tenderness: There is no abdominal tenderness  Musculoskeletal: Normal range of motion  Skin:     General: Skin is warm and dry  Neurological:      General: No focal deficit present  Mental Status: He is alert and oriented to person, place, and time  Psychiatric:         Mood and Affect: Mood normal          Behavior: Behavior normal          Thought Content:  Thought content normal          Judgment: Judgment normal        skin: acne like excoriations on shoulders, upper thorax and upper mid back

## 2021-01-28 NOTE — PROGRESS NOTES
Assessment and Plan:     Problem List Items Addressed This Visit        Respiratory    Mild intermittent asthma without complication - Primary     Well controlled  Pt uses albuterol once in the morning and once at night daily and advair once a day  Other    Medicare annual wellness visit, initial          Flu UTD   PNA x 2 UTD   eye   Dental  PSA UTD   colo ? BMI Counseling: Body mass index is 26 09 kg/m²  The BMI is above normal  Nutrition recommendations include moderation in carbohydrate intake  Exercise recommendations include exercising 3-5 times per week  No pharmacotherapy was ordered  Preventive health issues were discussed with patient, and age appropriate screening tests were ordered as noted in patient's After Visit Summary  Personalized health advice and appropriate referrals for health education or preventive services given if needed, as noted in patient's After Visit Summary       History of Present Illness:     Patient presents for Medicare Annual Wellness visit    Patient Care Team:  Dominique Renee DO as PCP - General  NITA Guy     Problem List:     Patient Active Problem List   Diagnosis    Screening for metabolic disorder    Encounter for hepatitis C screening test for low risk patient    Mild intermittent asthma without complication    Seasonal allergies    Medicare annual wellness visit, initial      Past Medical and Surgical History:     Past Medical History:   Diagnosis Date    Acute asthmatic bronchitis     Acute frontal sinusitis     Allergic     Asthma     History of acute pharyngitis     History of allergy     History of osteopenia     History of right inguinal hernia     History of seborrheic keratosis     History of umbilical hernia     Preop examination      Past Surgical History:   Procedure Laterality Date    APPENDECTOMY      INGUINAL HERNIA REPAIR      UMBILICAL HERNIA REPAIR        Family History:     Family History   Problem Relation Age of Onset    Cancer Father         Adenoid Cystic Carcinoma    Heart disease Family     Multiple myeloma Mother     Heart disease Maternal Grandfather       Social History:        Social History     Socioeconomic History    Marital status: /Civil Union     Spouse name: None    Number of children: None    Years of education: None    Highest education level: None   Occupational History    None   Social Needs    Financial resource strain: None    Food insecurity     Worry: None     Inability: None    Transportation needs     Medical: None     Non-medical: None   Tobacco Use    Smoking status: Former Smoker     Packs/day: 0 00     Years: 0 00     Pack years: 0 00     Quit date:      Years since quittin 0    Smokeless tobacco: Never Used   Substance and Sexual Activity    Alcohol use:  Yes     Alcohol/week: 0 0 standard drinks     Comment: rare    Drug use: No     Comment: Rarely consumes alcohol    Sexual activity: Yes   Lifestyle    Physical activity     Days per week: None     Minutes per session: None    Stress: None   Relationships    Social connections     Talks on phone: None     Gets together: None     Attends Latter day service: None     Active member of club or organization: None     Attends meetings of clubs or organizations: None     Relationship status: None    Intimate partner violence     Fear of current or ex partner: None     Emotionally abused: None     Physically abused: None     Forced sexual activity: None   Other Topics Concern    None   Social History Narrative    None      Medications and Allergies:     Current Outpatient Medications   Medication Sig Dispense Refill    albuterol (PROAIR HFA) 90 mcg/act inhaler Inhale 1 puff every 6 (six) hours as needed for wheezing or shortness of breath 18 g 3    fluticasone-salmeterol (ADVAIR DISKUS) 250-50 mcg/dose inhaler Inhale 1 puff 2 (two) times a day 3 Inhaler 1     No current facility-administered medications for this visit  Allergies   Allergen Reactions    Erythromycin GI Intolerance    Pollen Extract       Immunizations:     Immunization History   Administered Date(s) Administered    INFLUENZA 12/17/2019, 09/28/2020    Influenza Quadrivalent, 6-35 Months IM 02/16/2016    Pneumococcal Conjugate 13-Valent 02/17/2020    Tdap 08/15/2018      Health Maintenance:         Topic Date Due    Colorectal Cancer Screening  12/24/2021    Hepatitis C Screening  Completed     There are no preventive care reminders to display for this patient  Medicare Health Risk Assessment:     /80 (BP Location: Left arm, Patient Position: Sitting, Cuff Size: Adult)   Pulse 97   Temp 99 1 °F (37 3 °C) (Temporal)   Ht 5' 7 32" (1 71 m)   Wt 76 3 kg (168 lb 3 2 oz)   SpO2 96%   BMI 26 09 kg/m²      Baron Ellsworth is here for his Subsequent Wellness visit  Health Risk Assessment:   Patient rates overall health as very good  Patient feels that their physical health rating is same  Eyesight was rated as same  Hearing was rated as same  Patient feels that their emotional and mental health rating is same  Pain experienced in the last 7 days has been a lot  Patient's pain rating has been 5/10  Patient states that he has experienced no weight loss or gain in last 6 months  Depression Screening:   PHQ-2 Score: 0      Fall Risk Screening: In the past year, patient has experienced: history of falling in past year    Number of falls: 1  Injured during fall?: No    Feels unsteady when standing or walking?: No    Worried about falling?: No      Home Safety:  Patient does not have trouble with stairs inside or outside of their home  Patient has working smoke alarms and has working carbon monoxide detector  Home safety hazards include: none  Nutrition:   Current diet is Regular  Medications:   Patient is not currently taking any over-the-counter supplements  Patient is able to manage medications       Activities of Daily Living (ADLs)/Instrumental Activities of Daily Living (IADLs):   Walk and transfer into and out of bed and chair?: Yes  Dress and groom yourself?: Yes    Bathe or shower yourself?: Yes    Feed yourself? Yes  Do your laundry/housekeeping?: Yes  Manage your money, pay your bills and track your expenses?: Yes  Make your own meals?: Yes    Do your own shopping?: Yes    Previous Hospitalizations:   Any hospitalizations or ED visits within the last 12 months?: No      Advance Care Planning:   Living will: Yes    Durable POA for healthcare: Yes    Advanced directive: Yes    Five wishes given: Yes      Comments: His wife skinny    Cognitive Screening:   Provider or family/friend/caregiver concerned regarding cognition?: No    PREVENTIVE SCREENINGS      Cardiovascular Screening:    General: Screening Not Indicated and History Lipid Disorder      Diabetes Screening:     General: Screening Current      Colorectal Cancer Screening:     General: Screening Current      Prostate Cancer Screening:    General: Screening Current      Abdominal Aortic Aneurysm (AAA) Screening:    Risk factors include: age between 73-67 yo and tobacco use        Lung Cancer Screening:     General: Screening Not Indicated      Hepatitis C Screening:    General: Screening Current    Other Counseling Topics:   Alcohol use counseling, car/seat belt/driving safety, skin self-exam, sunscreen and regular weightbearing exercise and calcium and vitamin D intake         Heather Redman DO

## 2021-01-28 NOTE — ASSESSMENT & PLAN NOTE
Well controlled  Pt uses albuterol once in the morning and once at night daily and advair once a day

## 2021-02-24 ENCOUNTER — HOSPITAL ENCOUNTER (OUTPATIENT)
Dept: PULMONOLOGY | Facility: HOSPITAL | Age: 66
Discharge: HOME | End: 2021-02-24
Payer: MEDICARE

## 2021-02-24 DIAGNOSIS — R06.00 DOE (DYSPNEA ON EXERTION): ICD-10-CM

## 2021-02-24 PROCEDURE — 94010 BREATHING CAPACITY TEST: CPT | Performed by: INTERNAL MEDICINE

## 2021-02-24 PROCEDURE — 94760 N-INVAS EAR/PLS OXIMETRY 1: CPT

## 2021-02-24 PROCEDURE — 94729 DIFFUSING CAPACITY: CPT | Performed by: INTERNAL MEDICINE

## 2021-02-24 PROCEDURE — 94010 BREATHING CAPACITY TEST: CPT

## 2021-02-24 PROCEDURE — 94726 PLETHYSMOGRAPHY LUNG VOLUMES: CPT

## 2021-02-24 PROCEDURE — 94726 PLETHYSMOGRAPHY LUNG VOLUMES: CPT | Performed by: INTERNAL MEDICINE

## 2021-02-24 PROCEDURE — 94729 DIFFUSING CAPACITY: CPT

## 2021-03-10 DIAGNOSIS — Z23 ENCOUNTER FOR IMMUNIZATION: ICD-10-CM

## 2021-04-06 ENCOUNTER — PROCEDURE VISIT (OUTPATIENT)
Dept: INTERNAL MEDICINE CLINIC | Age: 66
End: 2021-04-06
Payer: MEDICARE

## 2021-04-06 VITALS
WEIGHT: 166.6 LBS | DIASTOLIC BLOOD PRESSURE: 70 MMHG | SYSTOLIC BLOOD PRESSURE: 148 MMHG | TEMPERATURE: 98.7 F | HEIGHT: 67 IN | OXYGEN SATURATION: 96 % | HEART RATE: 99 BPM | BODY MASS INDEX: 26.15 KG/M2

## 2021-04-06 DIAGNOSIS — L82.1 SEBORRHEIC KERATOSES: ICD-10-CM

## 2021-04-06 DIAGNOSIS — L30.9 DERMATITIS: Primary | ICD-10-CM

## 2021-04-06 DIAGNOSIS — L81.9 ATYPICAL PIGMENTED SKIN LESION: ICD-10-CM

## 2021-04-06 PROCEDURE — 11311 SHAVE SKIN LESION 0.6-1.0 CM: CPT | Performed by: FAMILY MEDICINE

## 2021-04-06 PROCEDURE — 88305 TISSUE EXAM BY PATHOLOGIST: CPT | Performed by: STUDENT IN AN ORGANIZED HEALTH CARE EDUCATION/TRAINING PROGRAM

## 2021-04-06 RX ORDER — PREDNISONE 50 MG/1
50 TABLET ORAL DAILY
Qty: 5 TABLET | Refills: 0 | Status: SHIPPED | OUTPATIENT
Start: 2021-04-06 | End: 2021-04-22

## 2021-04-06 NOTE — PROGRESS NOTES
Shave lesion    Date/Time: 4/6/2021 3:13 PM  Performed by: Pasquale Carter DO  Authorized by: Pasquale Carter DO   Universal Protocol:  Patient understanding: patient states understanding of the procedure being performed  Patient consent: the patient's understanding of the procedure matches consent given      Number of Lesions: 1  Lesion 1:     Body area: head/neck    Head/neck location: forehead (left upper)    Initial size (mm): 12    Final defect size (mm): 6    Malignancy: malignancy unknown      Destruction method: shave removal      Comments:  Post care instructions given to patient    Sample sent for pathology

## 2021-04-22 ENCOUNTER — PROCEDURE VISIT (OUTPATIENT)
Dept: INTERNAL MEDICINE CLINIC | Age: 66
End: 2021-04-22
Payer: MEDICARE

## 2021-04-22 VITALS
DIASTOLIC BLOOD PRESSURE: 72 MMHG | OXYGEN SATURATION: 97 % | SYSTOLIC BLOOD PRESSURE: 148 MMHG | HEART RATE: 88 BPM | WEIGHT: 168 LBS | TEMPERATURE: 98.3 F | HEIGHT: 68 IN | BODY MASS INDEX: 25.46 KG/M2

## 2021-04-22 DIAGNOSIS — L81.9 ATYPICAL PIGMENTED SKIN LESION: Primary | ICD-10-CM

## 2021-04-22 DIAGNOSIS — L82.1 SEBORRHEIC KERATOSES: ICD-10-CM

## 2021-04-22 PROCEDURE — 88305 TISSUE EXAM BY PATHOLOGIST: CPT | Performed by: PATHOLOGY

## 2021-04-22 PROCEDURE — 11311 SHAVE SKIN LESION 0.6-1.0 CM: CPT | Performed by: FAMILY MEDICINE

## 2021-04-22 NOTE — PROGRESS NOTES
Shave lesion    Date/Time: 4/22/2021 3:17 PM  Performed by: Briana Hensley DO  Authorized by: Briana Hensley DO   Universal Protocol:  Risks and benefits: risks, benefits and alternatives were discussed  Consent given by: patient  Patient understanding: patient states understanding of the procedure being performed  Patient identity confirmed: verbally with patient      Number of Lesions: 2  Lesion 1:     Body area: head/neck    Head/neck location: forehead (L temple)    Initial size (mm): 8    Final defect size (mm): 9    Malignancy: malignancy unknown      Destruction method: shave removal    Lesion 2:     Body area: head/neck    Head/neck location: forehead (R temple)    Initial size (mm): 8    Final defect size (mm): 9    Malignancy: malignancy unknown      Destruction method: shave removal      Comments:   Both sample sent for pathology

## 2021-05-11 PROBLEM — L82.1 SEBORRHEIC KERATOSES: Status: ACTIVE | Noted: 2021-05-11

## 2021-07-08 ENCOUNTER — OFFICE VISIT (OUTPATIENT)
Dept: INTERNAL MEDICINE CLINIC | Age: 66
End: 2021-07-08
Payer: MEDICARE

## 2021-07-08 VITALS
HEART RATE: 96 BPM | TEMPERATURE: 98.2 F | BODY MASS INDEX: 25.91 KG/M2 | DIASTOLIC BLOOD PRESSURE: 74 MMHG | HEIGHT: 68 IN | WEIGHT: 171 LBS | SYSTOLIC BLOOD PRESSURE: 132 MMHG | OXYGEN SATURATION: 98 %

## 2021-07-08 DIAGNOSIS — J45.20 MILD INTERMITTENT ASTHMA WITHOUT COMPLICATION: ICD-10-CM

## 2021-07-08 DIAGNOSIS — R06.00 DOE (DYSPNEA ON EXERTION): ICD-10-CM

## 2021-07-08 DIAGNOSIS — Z23 NEED FOR 23-POLYVALENT PNEUMOCOCCAL POLYSACCHARIDE VACCINE: ICD-10-CM

## 2021-07-08 DIAGNOSIS — E78.2 MIXED HYPERLIPIDEMIA: Primary | ICD-10-CM

## 2021-07-08 DIAGNOSIS — Z82.49 FAMILY HISTORY OF EARLY CAD: ICD-10-CM

## 2021-07-08 PROCEDURE — 99214 OFFICE O/P EST MOD 30 MIN: CPT | Performed by: FAMILY MEDICINE

## 2021-07-08 PROCEDURE — G0009 ADMIN PNEUMOCOCCAL VACCINE: HCPCS | Performed by: FAMILY MEDICINE

## 2021-07-08 PROCEDURE — 90732 PPSV23 VACC 2 YRS+ SUBQ/IM: CPT

## 2021-07-08 RX ORDER — ALBUTEROL SULFATE 90 UG/1
1 AEROSOL, METERED RESPIRATORY (INHALATION) EVERY 6 HOURS PRN
Qty: 18 G | Refills: 3 | Status: SHIPPED | OUTPATIENT
Start: 2021-07-08 | End: 2022-06-23

## 2021-07-08 NOTE — PROGRESS NOTES
Assessment/Plan:    1  Mixed hyperlipidemia  -     CT coronary calcium score; Future; Expected date: 07/08/2021    2  Mild intermittent asthma without complication  -     albuterol (ProAir HFA) 90 mcg/act inhaler; Inhale 1 puff every 6 (six) hours as needed for wheezing or shortness of breath    3  LAFLEUR (dyspnea on exertion)  -     CT coronary calcium score; Future; Expected date: 07/08/2021  -     Echo complete with contrast if indicated; Future; Expected date: 07/08/2021    4  Family history of early CAD  -     CT coronary calcium score; Future; Expected date: 07/08/2021  -     Echo complete with contrast if indicated; Future; Expected date: 07/08/2021    5  Need for 23-polyvalent pneumococcal polysaccharide vaccine  -     PNEUMOCOCCAL POLYSACCHARIDE VACCINE 23-VALENT =>3YO SQ IM            There are no Patient Instructions on file for this visit  Return in about 8 weeks (around 9/2/2021) for Recheck  Subjective:      Patient ID: Tessa Castillo is a 76 y o  male  Chief Complaint   Patient presents with    Follow-up     6 month fu - PFT 1/28/21- asthma        HPI  Asthma, intermittent, has some wheezing more so from his throat on a regular basis  [employer] uses Advair once a day even though he knows he is supposed to do it twice a day   Not on antihistamine or mast cell stabilizer   Uses albuterol inhaler every single morning once a day  Cyndy Kurtz is very energetic and works long hours in [employer] and also goes to [employer] and walks as exercise   He does not have baseline shortness of breath  July 2021, had PFTs done in January which showed FEV1 of 56%  At 1 point he has severe asthma and was on Advair 500 mg and currently is on 250  His FEV1 to use to be 46% according to him  His last PFT was done many many years ago    No recent exacerbations requiring ER visits hospitalizations or prednisone     Hyperlipidemia, currently not on medications   His cholesterol does fluctuate from time to time  Cyndy Kurtz does have family history but he eats very well and needs approximately 5-6 very small meals throughout the day on a regular basis  July 2021, last LDL remains elevated  Patient not on statin and did not get his stress test as recommended  He was concerned with the dobutamine and is asthma  He is requesting alternative testing  He has significant early family history in his father who had 3 acute MI eyes and then underwent chelation therapy which was beneficial to him  Patient occasionally has intermittent chest discomfort but nothing that lasts or makes him worried to go to the emergency room  He is very active and works and is yard     The following portions of the patient's history were reviewed and updated as appropriate: allergies, current medications, past family history, past medical history, past social history, past surgical history and problem list     Review of Systems      Constitutional:  Denies fever or chills , + weight gain  Eyes:  Denies double or blurry vision  HENT:  Denies nasal congestion or sore throat   Respiratory:  Denies cough or shortness of breath or wheezing  Cardiovascular:  Denies palpitations or chest pain  GI:  Denies abdominal pain, nausea, or vomiting, no loose stools, no reflux  Integument:  Denies rash , no open areas  Neurologic:  Denies headache or focal weakness, no dizziness  : no dysuria      Current Outpatient Medications   Medication Sig Dispense Refill    albuterol (ProAir HFA) 90 mcg/act inhaler Inhale 1 puff every 6 (six) hours as needed for wheezing or shortness of breath 18 g 3    fluticasone-salmeterol (Advair Diskus) 250-50 mcg/dose inhaler Inhale 1 puff 2 (two) times a day 3 Inhaler 1    triamcinolone (KENALOG) 0 5 % cream Apply topically 2 (two) times a day as needed for irritation or rash 30 g 4     No current facility-administered medications for this visit         Objective:    /74 (BP Location: Left arm, Patient Position: Sitting, Cuff Size: Standard) Pulse 96   Temp 98 2 °F (36 8 °C) (Temporal)   Ht 5' 7 52" (1 715 m)   Wt 77 6 kg (171 lb)   SpO2 98%   BMI 26 37 kg/m²        Physical Exam       Constitutional:  Well developed, well nourished, no acute distress, non-toxic appearance   Eyes:  PERRL, conjunctiva normal , non icteric sclera  HENT:  Atraumatic, oropharynx moist  Neck-  supple   Respiratory:  CTA b/l, normal breath sounds, no rales, no wheezing   Cardiovascular:  RRR, no murmurs, no LE edema b/l  GI:  Soft, nondistended, normal bowel sounds x 4, nontender, no organomegaly, no mass, no rebound, no guarding   Neurologic:  no focal deficits noted   Psychiatric:  Speech and behavior appropriate , AAO x 3        Lara Mesa DO

## 2021-07-20 ENCOUNTER — HOSPITAL ENCOUNTER (OUTPATIENT)
Dept: NON INVASIVE DIAGNOSTICS | Facility: CLINIC | Age: 66
Discharge: HOME | End: 2021-07-20
Payer: MEDICARE

## 2021-07-20 DIAGNOSIS — R06.00 DOE (DYSPNEA ON EXERTION): ICD-10-CM

## 2021-07-20 DIAGNOSIS — Z82.49 FAMILY HISTORY OF EARLY CAD: ICD-10-CM

## 2021-07-20 PROCEDURE — 93306 TTE W/DOPPLER COMPLETE: CPT

## 2021-07-20 PROCEDURE — 93306 TTE W/DOPPLER COMPLETE: CPT | Performed by: INTERNAL MEDICINE

## 2021-08-01 ENCOUNTER — HOSPITAL ENCOUNTER (OUTPATIENT)
Dept: CT IMAGING | Facility: HOSPITAL | Age: 66
Discharge: HOME | End: 2021-08-01
Payer: COMMERCIAL

## 2021-08-01 DIAGNOSIS — E78.2 MIXED HYPERLIPIDEMIA: ICD-10-CM

## 2021-08-01 DIAGNOSIS — R06.00 DOE (DYSPNEA ON EXERTION): ICD-10-CM

## 2021-08-01 DIAGNOSIS — Z82.49 FAMILY HISTORY OF EARLY CAD: ICD-10-CM

## 2021-08-01 PROCEDURE — 75571 CT HRT W/O DYE W/CA TEST: CPT

## 2021-08-01 PROCEDURE — G1004 CDSM NDSC: HCPCS

## 2021-09-01 ENCOUNTER — APPOINTMENT (OUTPATIENT)
Dept: LAB | Age: 66
End: 2021-09-01
Payer: MEDICARE

## 2021-09-01 DIAGNOSIS — J45.20 MILD INTERMITTENT ASTHMA WITHOUT COMPLICATION: ICD-10-CM

## 2021-09-01 DIAGNOSIS — E78.2 MIXED HYPERLIPIDEMIA: ICD-10-CM

## 2021-09-01 DIAGNOSIS — M94.9 DISORDER OF CARTILAGE, UNSPECIFIED: ICD-10-CM

## 2021-09-01 DIAGNOSIS — L30.9 DERMATITIS: ICD-10-CM

## 2021-09-01 DIAGNOSIS — M85.89 OSTEOPENIA OF MULTIPLE SITES: ICD-10-CM

## 2021-09-01 LAB
25(OH)D3 SERPL-MCNC: 69.1 NG/ML (ref 30–100)
ALBUMIN SERPL BCP-MCNC: 3.5 G/DL (ref 3.5–5)
ALP SERPL-CCNC: 66 U/L (ref 46–116)
ALT SERPL W P-5'-P-CCNC: 27 U/L (ref 12–78)
ANION GAP SERPL CALCULATED.3IONS-SCNC: 0 MMOL/L (ref 4–13)
AST SERPL W P-5'-P-CCNC: 18 U/L (ref 5–45)
BASOPHILS # BLD AUTO: 0.11 THOUSANDS/ΜL (ref 0–0.1)
BASOPHILS NFR BLD AUTO: 1 % (ref 0–1)
BILIRUB SERPL-MCNC: 0.52 MG/DL (ref 0.2–1)
BUN SERPL-MCNC: 17 MG/DL (ref 5–25)
CALCIUM SERPL-MCNC: 8.6 MG/DL (ref 8.3–10.1)
CHLORIDE SERPL-SCNC: 109 MMOL/L (ref 100–108)
CHOLEST SERPL-MCNC: 228 MG/DL (ref 50–200)
CO2 SERPL-SCNC: 31 MMOL/L (ref 21–32)
CREAT SERPL-MCNC: 1.14 MG/DL (ref 0.6–1.3)
CRP SERPL QL: <3 MG/L
EOSINOPHIL # BLD AUTO: 0.49 THOUSAND/ΜL (ref 0–0.61)
EOSINOPHIL NFR BLD AUTO: 6 % (ref 0–6)
ERYTHROCYTE [DISTWIDTH] IN BLOOD BY AUTOMATED COUNT: 12.3 % (ref 11.6–15.1)
GFR SERPL CREATININE-BSD FRML MDRD: 67 ML/MIN/1.73SQ M
GLUCOSE P FAST SERPL-MCNC: 81 MG/DL (ref 65–99)
HCT VFR BLD AUTO: 53 % (ref 36.5–49.3)
HDLC SERPL-MCNC: 48 MG/DL
HGB BLD-MCNC: 17.3 G/DL (ref 12–17)
IMM GRANULOCYTES # BLD AUTO: 0.02 THOUSAND/UL (ref 0–0.2)
IMM GRANULOCYTES NFR BLD AUTO: 0 % (ref 0–2)
LDLC SERPL CALC-MCNC: 142 MG/DL (ref 0–100)
LYMPHOCYTES # BLD AUTO: 2.01 THOUSANDS/ΜL (ref 0.6–4.47)
LYMPHOCYTES NFR BLD AUTO: 25 % (ref 14–44)
MCH RBC QN AUTO: 31.1 PG (ref 26.8–34.3)
MCHC RBC AUTO-ENTMCNC: 32.6 G/DL (ref 31.4–37.4)
MCV RBC AUTO: 95 FL (ref 82–98)
MONOCYTES # BLD AUTO: 0.83 THOUSAND/ΜL (ref 0.17–1.22)
MONOCYTES NFR BLD AUTO: 10 % (ref 4–12)
NEUTROPHILS # BLD AUTO: 4.54 THOUSANDS/ΜL (ref 1.85–7.62)
NEUTS SEG NFR BLD AUTO: 58 % (ref 43–75)
NONHDLC SERPL-MCNC: 180 MG/DL
NRBC BLD AUTO-RTO: 0 /100 WBCS
PLATELET # BLD AUTO: 268 THOUSANDS/UL (ref 149–390)
PMV BLD AUTO: 9.2 FL (ref 8.9–12.7)
POTASSIUM SERPL-SCNC: 4.4 MMOL/L (ref 3.5–5.3)
PROT SERPL-MCNC: 6.9 G/DL (ref 6.4–8.2)
RBC # BLD AUTO: 5.57 MILLION/UL (ref 3.88–5.62)
SODIUM SERPL-SCNC: 140 MMOL/L (ref 136–145)
TRIGL SERPL-MCNC: 190 MG/DL
WBC # BLD AUTO: 8 THOUSAND/UL (ref 4.31–10.16)

## 2021-09-01 PROCEDURE — 36415 COLL VENOUS BLD VENIPUNCTURE: CPT

## 2021-09-01 PROCEDURE — 80061 LIPID PANEL: CPT

## 2021-09-01 PROCEDURE — 80053 COMPREHEN METABOLIC PANEL: CPT

## 2021-09-01 PROCEDURE — 82306 VITAMIN D 25 HYDROXY: CPT

## 2021-09-01 PROCEDURE — 85025 COMPLETE CBC W/AUTO DIFF WBC: CPT

## 2021-09-01 PROCEDURE — 86140 C-REACTIVE PROTEIN: CPT

## 2021-09-07 ENCOUNTER — OFFICE VISIT (OUTPATIENT)
Dept: INTERNAL MEDICINE CLINIC | Facility: CLINIC | Age: 66
End: 2021-09-07
Payer: MEDICARE

## 2021-09-07 VITALS
WEIGHT: 173.6 LBS | DIASTOLIC BLOOD PRESSURE: 84 MMHG | OXYGEN SATURATION: 98 % | BODY MASS INDEX: 26.31 KG/M2 | HEART RATE: 80 BPM | TEMPERATURE: 98.8 F | HEIGHT: 68 IN | SYSTOLIC BLOOD PRESSURE: 158 MMHG

## 2021-09-07 DIAGNOSIS — J31.0 CHRONIC RHINITIS: ICD-10-CM

## 2021-09-07 DIAGNOSIS — J30.2 OTHER SEASONAL ALLERGIC RHINITIS: ICD-10-CM

## 2021-09-07 DIAGNOSIS — R07.89 OTHER CHEST PAIN: ICD-10-CM

## 2021-09-07 DIAGNOSIS — E78.2 MIXED HYPERLIPIDEMIA: Primary | ICD-10-CM

## 2021-09-07 DIAGNOSIS — J45.20 MILD INTERMITTENT ASTHMA WITHOUT COMPLICATION: ICD-10-CM

## 2021-09-07 DIAGNOSIS — Z12.5 SCREENING PSA (PROSTATE SPECIFIC ANTIGEN): ICD-10-CM

## 2021-09-07 DIAGNOSIS — Z82.49 FAMILY HISTORY OF EARLY CAD: ICD-10-CM

## 2021-09-07 DIAGNOSIS — R06.00 DOE (DYSPNEA ON EXERTION): ICD-10-CM

## 2021-09-07 PROCEDURE — 99214 OFFICE O/P EST MOD 30 MIN: CPT | Performed by: FAMILY MEDICINE

## 2021-09-07 RX ORDER — MONTELUKAST SODIUM 10 MG/1
10 TABLET ORAL
Qty: 90 TABLET | Refills: 1 | Status: SHIPPED | OUTPATIENT
Start: 2021-09-07 | End: 2021-12-07

## 2021-09-07 NOTE — PROGRESS NOTES
Assessment/Plan:    1  Mixed hyperlipidemia  -     Lipid panel; Future  -     Comprehensive metabolic panel; Future  -     [facility] Allergy Panel, Adult; Future  -     Stress test only, exercise; Future; Expected date: 09/07/2021    2  Mild intermittent asthma without complication  -     montelukast (SINGULAIR) 10 mg tablet; Take 1 tablet (10 mg total) by mouth daily at bedtime    3  Family history of early CAD  -     Stress test only, exercise; Future; Expected date: 09/07/2021    4  Screening PSA (prostate specific antigen)  -     PSA, Total Screen; Future    5  Other seasonal allergic rhinitis   -     [facility] Allergy Panel, Adult; Future    6  Chronic rhinitis   -     [facility] Allergy Panel, Adult; Future    7  Other chest pain  -     Stress test only, exercise; Future; Expected date: 09/07/2021    8  LAFLEUR (dyspnea on exertion)      BMI Counseling: Body mass index is 26 4 kg/m²  The BMI is above normal  Nutrition recommendations include moderation in carbohydrate intake  Exercise recommendations include exercising 3-5 times per week  No pharmacotherapy was ordered  Recommend ASA 81 mg daily, complete stress test, changed to exercise stress test per patient request    There are no Patient Instructions on file for this visit  Return in about 6 months (around 3/7/2022) for Recheck  Subjective:      Patient ID: Festus White is a 76 y o  male  Chief Complaint   Patient presents with    Follow-up     Follow up test results  Labs done 9/1/21 and ECHO/CT scan   Health Maintenance     BMI follow up due 2/15/22          HPI      Asthma, intermittent, has some wheezing more so from his throat on a regular basis  Carey Martinez uses Advair once a day even though he knows he is supposed to do it twice a day   Not on antihistamine or mast cell stabilizer   Uses albuterol inhaler every single morning once a day  [facility] is very energetic and works long hours in [employer] and also goes to [employer] and walks as exercise   He does not have baseline shortness of breath  July 2021, had PFTs done in January which showed FEV1 of 56%  At 1 point he has severe asthma and was on Advair 500 mg and currently is on 250  His FEV1 to use to be 46% according to him  His last PFT was done many many years ago  No recent exacerbations requiring ER visits hospitalizations or prednisone  Sept 2021: stable, no exacerbations, has had LAFLEUR with heavy lifting in the yard      Hyperlipidemia, currently not on medications   His cholesterol does fluctuate from time to time  [facility] does have family history but he eats very well and needs approximately 5-6 very small meals throughout the day on a regular basis  July 2021, last LDL remains elevated  Patient not on statin and did not get his stress test as recommended  He was concerned with the dobutamine and is asthma  He is requesting alternative testing  He has significant early family history in his father who had 3 acute MI eyes and then underwent chelation therapy which was beneficial to him  Patient occasionally has intermittent chest discomfort but nothing that lasts or makes him worried to go to the emergency room    He is very active and works and is yard  [facility] 2021: had labs done, improved lipids, not on meds, Ct calcium score Is resulted he is here for results      Chest pain:  Intermittent CP with exertion, resolves with aspirin, was recommended stress test but never scheduled    The following portions of the patient's history were reviewed and updated as appropriate: allergies, current medications, past family history, past medical history, past social history, past surgical history and problem list     Review of Systems      Constitutional:  Denies fever or chills   Eyes:  Denies double or blurry vision  HENT:  Denies nasal congestion or sore throat   Respiratory:  Denies cough or shortness of breath or wheezing  Cardiovascular:  Denies palpitations or chest pain  GI:  Denies abdominal pain, nausea, or vomiting, no loose stools, no reflux  Integument:  Denies rash , no open areas  Neurologic:  Denies headache or focal weakness, no dizziness  : no dysuria      Current Outpatient Medications   Medication Sig Dispense Refill    albuterol (ProAir HFA) 90 mcg/act inhaler Inhale 1 puff every 6 (six) hours as needed for wheezing or shortness of breath 18 g 3    fluticasone-salmeterol (Advair Diskus) 250-50 mcg/dose inhaler Inhale 1 puff 2 (two) times a day 3 Inhaler 1    triamcinolone (KENALOG) 0 5 % cream Apply topically 2 (two) times a day as needed for irritation or rash 30 g 4    montelukast (SINGULAIR) 10 mg tablet Take 1 tablet (10 mg total) by mouth daily at bedtime 90 tablet 1     No current facility-administered medications for this visit         Objective:    /84 (BP Location: Right arm, Patient Position: Sitting, Cuff Size: Adult)   Pulse 80   Temp 98 8 °F (37 1 °C) (Tympanic)   Ht 5' 8" (1 727 m)   Wt 78 7 kg (173 lb 9 6 oz)   SpO2 98% Comment: Room air  BMI 26 40 kg/m²        Physical Exam       Constitutional:  Well developed, well nourished, no acute distress, non-toxic appearance   Eyes:  PERRL, conjunctiva normal , non icteric sclera  HENT:  Atraumatic, oropharynx moist  Neck-  supple   Respiratory:  CTA b/l, normal breath sounds, no rales, no wheezing   Cardiovascular:  RRR, no murmurs, no LE edema b/l  GI:  Soft, nondistended, normal bowel sounds x 4, nontender, no organomegaly, no mass, no rebound, no guarding   Neurologic:  no focal deficits noted   Psychiatric:  Speech and behavior appropriate , AAO x 3        Manuela Lopes DO

## 2021-09-20 ENCOUNTER — HOSPITAL ENCOUNTER (OUTPATIENT)
Dept: NON INVASIVE DIAGNOSTICS | Facility: CLINIC | Age: 66
Discharge: HOME | End: 2021-09-20
Payer: MEDICARE

## 2021-09-20 DIAGNOSIS — Z82.49 FAMILY HISTORY OF EARLY CAD: ICD-10-CM

## 2021-09-20 DIAGNOSIS — E78.2 MIXED HYPERLIPIDEMIA: ICD-10-CM

## 2021-09-20 DIAGNOSIS — R07.89 OTHER CHEST PAIN: ICD-10-CM

## 2021-09-20 PROCEDURE — 93017 CV STRESS TEST TRACING ONLY: CPT

## 2021-09-20 PROCEDURE — 93016 CV STRESS TEST SUPVJ ONLY: CPT | Performed by: INTERNAL MEDICINE

## 2021-09-20 PROCEDURE — 93018 CV STRESS TEST I&R ONLY: CPT | Performed by: INTERNAL MEDICINE

## 2021-09-21 LAB
ARRHY DURING EX: NORMAL
CHEST PAIN STATEMENT: NORMAL
MAX DIASTOLIC BP: 100 MMHG
MAX HEART RATE: 148 BPM
MAX PREDICTED HEART RATE: 154 BPM
MAX. SYSTOLIC BP: 170 MMHG
PROTOCOL NAME: NORMAL
REASON FOR TERMINATION: NORMAL
TARGET HR FORMULA: NORMAL
TEST INDICATION: NORMAL
TIME IN EXERCISE PHASE: NORMAL

## 2021-11-10 ENCOUNTER — OFFICE VISIT (OUTPATIENT)
Dept: CARDIOLOGY CLINIC | Facility: CLINIC | Age: 66
End: 2021-11-10
Payer: MEDICARE

## 2021-11-10 VITALS
HEIGHT: 68 IN | WEIGHT: 176 LBS | SYSTOLIC BLOOD PRESSURE: 164 MMHG | DIASTOLIC BLOOD PRESSURE: 98 MMHG | HEART RATE: 86 BPM | BODY MASS INDEX: 26.67 KG/M2

## 2021-11-10 DIAGNOSIS — Z82.49 FAMILY HISTORY OF EARLY CAD: ICD-10-CM

## 2021-11-10 DIAGNOSIS — R07.89 OTHER CHEST PAIN: Primary | ICD-10-CM

## 2021-11-10 DIAGNOSIS — E78.2 MIXED HYPERLIPIDEMIA: ICD-10-CM

## 2021-11-10 DIAGNOSIS — R06.00 DOE (DYSPNEA ON EXERTION): ICD-10-CM

## 2021-11-10 DIAGNOSIS — R94.39 ABNORMAL STRESS TEST: ICD-10-CM

## 2021-11-10 PROCEDURE — 99204 OFFICE O/P NEW MOD 45 MIN: CPT | Performed by: INTERNAL MEDICINE

## 2021-11-10 PROCEDURE — 93000 ELECTROCARDIOGRAM COMPLETE: CPT | Performed by: INTERNAL MEDICINE

## 2021-11-10 RX ORDER — ATORVASTATIN CALCIUM 40 MG/1
40 TABLET, FILM COATED ORAL DAILY
Qty: 30 TABLET | Refills: 3 | Status: SHIPPED | OUTPATIENT
Start: 2021-11-10

## 2021-11-12 ENCOUNTER — IMMUNIZATIONS (OUTPATIENT)
Dept: FAMILY MEDICINE CLINIC | Facility: HOSPITAL | Age: 66
End: 2021-11-12

## 2021-11-12 DIAGNOSIS — Z23 ENCOUNTER FOR IMMUNIZATION: Primary | ICD-10-CM

## 2021-11-12 PROCEDURE — 91306 COVID-19 MODERNA VACC 0.25 ML BOOSTER: CPT

## 2021-11-12 PROCEDURE — 0013A COVID-19 MODERNA VACC 0.25 ML BOOSTER: CPT

## 2021-11-18 ENCOUNTER — HOSPITAL ENCOUNTER (OUTPATIENT)
Dept: NON INVASIVE DIAGNOSTICS | Facility: CLINIC | Age: 66
Discharge: HOME | End: 2021-11-18
Payer: MEDICARE

## 2021-11-18 DIAGNOSIS — Z82.49 FAMILY HISTORY OF EARLY CAD: ICD-10-CM

## 2021-11-18 DIAGNOSIS — R07.89 OTHER CHEST PAIN: ICD-10-CM

## 2021-11-18 DIAGNOSIS — R06.00 DOE (DYSPNEA ON EXERTION): ICD-10-CM

## 2021-11-18 DIAGNOSIS — E78.2 MIXED HYPERLIPIDEMIA: ICD-10-CM

## 2021-11-18 LAB
BASELINE ST DEPRESSION: 0 MM
MAX HR PERCENT: 93 %
MAX HR: 131 BPM
NUC STRESS EJECTION FRACTION: 78 %
RATE PRESSURE PRODUCT: NORMAL
SL CV REST NUCLEAR ISOTOPE DOSE: 10.58 MCI
SL CV STRESS NUCLEAR ISOTOPE DOSE: 32.2 MCI
SL CV STRESS RECOVERY BP: NORMAL MMHG
SL CV STRESS RECOVERY HR: 93 BPM
SL CV STRESS RECOVERY O2 SAT: 96 %
SL CV STRESS STAGE REACHED: 2
STRESS ANGINA INDEX: 1
STRESS BASELINE BP: NORMAL MMHG
STRESS BASELINE HR: 84 BPM
STRESS DUKE TREADMILL SCORE: -14
STRESS O2 SAT REST: 98 %
STRESS PEAK HR: 144 BPM
STRESS PERCENT HR: 93 %
STRESS POST ESTIMATED WORKLOAD: 7 METS
STRESS POST EXERCISE DUR MIN: 5 MIN
STRESS POST EXERCISE DUR SEC: 30 SEC
STRESS POST O2 SAT PEAK: 96 %
STRESS POST PEAK BP: 210 MMHG
STRESS ST DEPRESSION: 3 MM
STRESS TARGET HR: 144 BPM
STRESS/REST PERFUSION RATIO: 1.03

## 2021-11-18 PROCEDURE — 93018 CV STRESS TEST I&R ONLY: CPT | Performed by: INTERNAL MEDICINE

## 2021-11-18 PROCEDURE — A9502 TC99M TETROFOSMIN: HCPCS

## 2021-11-18 PROCEDURE — 78452 HT MUSCLE IMAGE SPECT MULT: CPT

## 2021-11-18 PROCEDURE — 93017 CV STRESS TEST TRACING ONLY: CPT

## 2021-11-18 PROCEDURE — 93016 CV STRESS TEST SUPVJ ONLY: CPT | Performed by: INTERNAL MEDICINE

## 2021-11-18 PROCEDURE — 78452 HT MUSCLE IMAGE SPECT MULT: CPT | Performed by: INTERNAL MEDICINE

## 2021-11-18 PROCEDURE — G1004 CDSM NDSC: HCPCS

## 2021-11-19 ENCOUNTER — PREP FOR PROCEDURE (OUTPATIENT)
Dept: CARDIOLOGY CLINIC | Facility: CLINIC | Age: 66
End: 2021-11-19

## 2021-11-19 ENCOUNTER — TELEPHONE (OUTPATIENT)
Dept: CARDIOLOGY CLINIC | Facility: CLINIC | Age: 66
End: 2021-11-19

## 2021-11-19 DIAGNOSIS — R94.39 ABNORMAL STRESS TEST: Primary | ICD-10-CM

## 2021-11-19 LAB
CHEST PAIN STATEMENT: NORMAL
MAX DIASTOLIC BP: 90 MMHG
MAX HEART RATE: 144 BPM
MAX PREDICTED HEART RATE: 154 BPM
MAX. SYSTOLIC BP: 200 MMHG
PROTOCOL NAME: NORMAL
TARGET HR FORMULA: NORMAL
TEST INDICATION: NORMAL
TIME IN EXERCISE PHASE: NORMAL

## 2021-11-24 ENCOUNTER — APPOINTMENT (OUTPATIENT)
Dept: LAB | Age: 66
End: 2021-11-24
Payer: MEDICARE

## 2021-11-24 DIAGNOSIS — R94.39 ABNORMAL STRESS TEST: ICD-10-CM

## 2021-11-24 LAB
ALBUMIN SERPL BCP-MCNC: 3.8 G/DL (ref 3.5–5)
ALP SERPL-CCNC: 57 U/L (ref 46–116)
ALT SERPL W P-5'-P-CCNC: 30 U/L (ref 12–78)
ANION GAP SERPL CALCULATED.3IONS-SCNC: 3 MMOL/L (ref 4–13)
AST SERPL W P-5'-P-CCNC: 20 U/L (ref 5–45)
BASOPHILS # BLD AUTO: 0.08 THOUSANDS/ΜL (ref 0–0.1)
BASOPHILS NFR BLD AUTO: 1 % (ref 0–1)
BILIRUB SERPL-MCNC: 0.62 MG/DL (ref 0.2–1)
BUN SERPL-MCNC: 18 MG/DL (ref 5–25)
CALCIUM SERPL-MCNC: 8.9 MG/DL (ref 8.3–10.1)
CHLORIDE SERPL-SCNC: 105 MMOL/L (ref 100–108)
CO2 SERPL-SCNC: 30 MMOL/L (ref 21–32)
CREAT SERPL-MCNC: 1.24 MG/DL (ref 0.6–1.3)
EOSINOPHIL # BLD AUTO: 0.3 THOUSAND/ΜL (ref 0–0.61)
EOSINOPHIL NFR BLD AUTO: 4 % (ref 0–6)
ERYTHROCYTE [DISTWIDTH] IN BLOOD BY AUTOMATED COUNT: 12.6 % (ref 11.6–15.1)
GFR SERPL CREATININE-BSD FRML MDRD: 60 ML/MIN/1.73SQ M
GLUCOSE P FAST SERPL-MCNC: 85 MG/DL (ref 65–99)
HCT VFR BLD AUTO: 54.8 % (ref 36.5–49.3)
HGB BLD-MCNC: 17.9 G/DL (ref 12–17)
IMM GRANULOCYTES # BLD AUTO: 0.01 THOUSAND/UL (ref 0–0.2)
IMM GRANULOCYTES NFR BLD AUTO: 0 % (ref 0–2)
LYMPHOCYTES # BLD AUTO: 2.09 THOUSANDS/ΜL (ref 0.6–4.47)
LYMPHOCYTES NFR BLD AUTO: 28 % (ref 14–44)
MCH RBC QN AUTO: 30.7 PG (ref 26.8–34.3)
MCHC RBC AUTO-ENTMCNC: 32.7 G/DL (ref 31.4–37.4)
MCV RBC AUTO: 94 FL (ref 82–98)
MONOCYTES # BLD AUTO: 0.81 THOUSAND/ΜL (ref 0.17–1.22)
MONOCYTES NFR BLD AUTO: 11 % (ref 4–12)
NEUTROPHILS # BLD AUTO: 4.2 THOUSANDS/ΜL (ref 1.85–7.62)
NEUTS SEG NFR BLD AUTO: 56 % (ref 43–75)
NRBC BLD AUTO-RTO: 0 /100 WBCS
PLATELET # BLD AUTO: 282 THOUSANDS/UL (ref 149–390)
PMV BLD AUTO: 9.6 FL (ref 8.9–12.7)
POTASSIUM SERPL-SCNC: 4.5 MMOL/L (ref 3.5–5.3)
PROT SERPL-MCNC: 7.2 G/DL (ref 6.4–8.2)
RBC # BLD AUTO: 5.83 MILLION/UL (ref 3.88–5.62)
SODIUM SERPL-SCNC: 138 MMOL/L (ref 136–145)
WBC # BLD AUTO: 7.49 THOUSAND/UL (ref 4.31–10.16)

## 2021-11-24 PROCEDURE — 85025 COMPLETE CBC W/AUTO DIFF WBC: CPT

## 2021-11-24 PROCEDURE — 36415 COLL VENOUS BLD VENIPUNCTURE: CPT

## 2021-11-24 PROCEDURE — 80053 COMPREHEN METABOLIC PANEL: CPT

## 2021-11-30 ENCOUNTER — HOSPITAL ENCOUNTER (OUTPATIENT)
Facility: HOSPITAL | Age: 66
Setting detail: OUTPATIENT SURGERY
Discharge: HOME | End: 2021-11-30
Attending: INTERNAL MEDICINE | Admitting: INTERNAL MEDICINE
Payer: MEDICARE

## 2021-11-30 VITALS
DIASTOLIC BLOOD PRESSURE: 65 MMHG | OXYGEN SATURATION: 96 % | SYSTOLIC BLOOD PRESSURE: 129 MMHG | HEART RATE: 76 BPM | TEMPERATURE: 97.7 F | RESPIRATION RATE: 18 BRPM

## 2021-11-30 DIAGNOSIS — Z82.49 FAMILY HISTORY OF EARLY CAD: ICD-10-CM

## 2021-11-30 DIAGNOSIS — Z95.5 STATUS POST INSERTION OF DRUG ELUTING CORONARY ARTERY STENT: Primary | ICD-10-CM

## 2021-11-30 DIAGNOSIS — R06.00 DOE (DYSPNEA ON EXERTION): ICD-10-CM

## 2021-11-30 DIAGNOSIS — R07.89 OTHER CHEST PAIN: ICD-10-CM

## 2021-11-30 DIAGNOSIS — R94.39 ABNORMAL STRESS TEST: ICD-10-CM

## 2021-11-30 LAB
KCT BLD-ACNC: 225 SEC (ref 89–137)
SPECIMEN SOURCE: ABNORMAL

## 2021-11-30 PROCEDURE — NC001 PR NO CHARGE: Performed by: INTERNAL MEDICINE

## 2021-11-30 PROCEDURE — C1725 CATH, TRANSLUMIN NON-LASER: HCPCS | Performed by: INTERNAL MEDICINE

## 2021-11-30 PROCEDURE — C9600 PERC DRUG-EL COR STENT SING: HCPCS | Performed by: INTERNAL MEDICINE

## 2021-11-30 PROCEDURE — 99153 MOD SED SAME PHYS/QHP EA: CPT | Performed by: INTERNAL MEDICINE

## 2021-11-30 PROCEDURE — C1769 GUIDE WIRE: HCPCS | Performed by: INTERNAL MEDICINE

## 2021-11-30 PROCEDURE — C1887 CATHETER, GUIDING: HCPCS | Performed by: INTERNAL MEDICINE

## 2021-11-30 PROCEDURE — 99152 MOD SED SAME PHYS/QHP 5/>YRS: CPT | Performed by: INTERNAL MEDICINE

## 2021-11-30 PROCEDURE — C1874 STENT, COATED/COV W/DEL SYS: HCPCS | Performed by: INTERNAL MEDICINE

## 2021-11-30 PROCEDURE — 93454 CORONARY ARTERY ANGIO S&I: CPT | Performed by: INTERNAL MEDICINE

## 2021-11-30 PROCEDURE — C1894 INTRO/SHEATH, NON-LASER: HCPCS | Performed by: INTERNAL MEDICINE

## 2021-11-30 PROCEDURE — 85347 COAGULATION TIME ACTIVATED: CPT

## 2021-11-30 PROCEDURE — 92928 PRQ TCAT PLMT NTRAC ST 1 LES: CPT | Performed by: INTERNAL MEDICINE

## 2021-11-30 DEVICE — XIENCE SKYPOINT™ EVEROLIMUS ELUTING CORONARY STENT SYSTEM 3.25 MM X 23 MM / RAPID-EXCHANGE
Type: IMPLANTABLE DEVICE | Status: FUNCTIONAL
Brand: XIENCE SKYPOINT™

## 2021-11-30 RX ORDER — VERAPAMIL HCL 2.5 MG/ML
AMPUL (ML) INTRAVENOUS AS NEEDED
Status: DISCONTINUED | OUTPATIENT
Start: 2021-11-30 | End: 2021-11-30 | Stop reason: HOSPADM

## 2021-11-30 RX ORDER — NITROGLYCERIN 0.4 MG/1
0.4 TABLET SUBLINGUAL
Status: DISCONTINUED | OUTPATIENT
Start: 2021-11-30 | End: 2021-11-30 | Stop reason: HOSPADM

## 2021-11-30 RX ORDER — ASPIRIN 81 MG/1
81 TABLET, CHEWABLE ORAL DAILY
Status: DISCONTINUED | OUTPATIENT
Start: 2021-11-30 | End: 2021-11-30

## 2021-11-30 RX ORDER — FENTANYL CITRATE 50 UG/ML
INJECTION, SOLUTION INTRAMUSCULAR; INTRAVENOUS AS NEEDED
Status: DISCONTINUED | OUTPATIENT
Start: 2021-11-30 | End: 2021-11-30 | Stop reason: HOSPADM

## 2021-11-30 RX ORDER — SODIUM CHLORIDE 9 MG/ML
125 INJECTION, SOLUTION INTRAVENOUS CONTINUOUS
Status: DISCONTINUED | OUTPATIENT
Start: 2021-11-30 | End: 2021-11-30 | Stop reason: HOSPADM

## 2021-11-30 RX ORDER — METOPROLOL SUCCINATE 25 MG/1
12.5 TABLET, EXTENDED RELEASE ORAL DAILY
Qty: 30 TABLET | Refills: 0 | Status: SHIPPED | OUTPATIENT
Start: 2021-11-30 | End: 2021-12-07 | Stop reason: SDUPTHER

## 2021-11-30 RX ORDER — NITROGLYCERIN 20 MG/100ML
INJECTION INTRAVENOUS AS NEEDED
Status: DISCONTINUED | OUTPATIENT
Start: 2021-11-30 | End: 2021-11-30 | Stop reason: HOSPADM

## 2021-11-30 RX ORDER — CLOPIDOGREL BISULFATE 75 MG/1
75 TABLET ORAL DAILY
Qty: 30 TABLET | Refills: 11 | Status: SHIPPED | OUTPATIENT
Start: 2021-12-01 | End: 2021-12-20 | Stop reason: ALTCHOICE

## 2021-11-30 RX ORDER — ASPIRIN 81 MG/1
81 TABLET, CHEWABLE ORAL DAILY
Status: DISCONTINUED | OUTPATIENT
Start: 2021-12-01 | End: 2021-11-30 | Stop reason: HOSPADM

## 2021-11-30 RX ORDER — ASPIRIN 81 MG/1
81 TABLET, CHEWABLE ORAL DAILY
Qty: 30 TABLET | Refills: 0 | Status: SHIPPED | OUTPATIENT
Start: 2021-12-01

## 2021-11-30 RX ORDER — METOPROLOL TARTRATE 5 MG/5ML
INJECTION INTRAVENOUS AS NEEDED
Status: DISCONTINUED | OUTPATIENT
Start: 2021-11-30 | End: 2021-11-30 | Stop reason: HOSPADM

## 2021-11-30 RX ORDER — ACETAMINOPHEN 325 MG/1
650 TABLET ORAL EVERY 8 HOURS PRN
Status: DISCONTINUED | OUTPATIENT
Start: 2021-11-30 | End: 2021-11-30 | Stop reason: HOSPADM

## 2021-11-30 RX ORDER — LIDOCAINE HYDROCHLORIDE 10 MG/ML
INJECTION, SOLUTION EPIDURAL; INFILTRATION; INTRACAUDAL; PERINEURAL AS NEEDED
Status: DISCONTINUED | OUTPATIENT
Start: 2021-11-30 | End: 2021-11-30 | Stop reason: HOSPADM

## 2021-11-30 RX ORDER — CLOPIDOGREL BISULFATE 75 MG/1
75 TABLET ORAL DAILY
Status: DISCONTINUED | OUTPATIENT
Start: 2021-12-01 | End: 2021-11-30 | Stop reason: HOSPADM

## 2021-11-30 RX ORDER — MIDAZOLAM HYDROCHLORIDE 2 MG/2ML
INJECTION, SOLUTION INTRAMUSCULAR; INTRAVENOUS AS NEEDED
Status: DISCONTINUED | OUTPATIENT
Start: 2021-11-30 | End: 2021-11-30 | Stop reason: HOSPADM

## 2021-11-30 RX ORDER — HEPARIN SODIUM 1000 [USP'U]/ML
INJECTION, SOLUTION INTRAVENOUS; SUBCUTANEOUS AS NEEDED
Status: DISCONTINUED | OUTPATIENT
Start: 2021-11-30 | End: 2021-11-30 | Stop reason: HOSPADM

## 2021-11-30 RX ORDER — CLOPIDOGREL BISULFATE 75 MG/1
600 TABLET ORAL ONCE
Status: COMPLETED | OUTPATIENT
Start: 2021-11-30 | End: 2021-11-30

## 2021-11-30 RX ORDER — ASPIRIN 81 MG/1
324 TABLET, CHEWABLE ORAL ONCE
Status: COMPLETED | OUTPATIENT
Start: 2021-11-30 | End: 2021-11-30

## 2021-11-30 RX ADMIN — ASPIRIN 324 MG: 81 TABLET, CHEWABLE ORAL at 08:44

## 2021-11-30 RX ADMIN — CLOPIDOGREL BISULFATE 600 MG: 75 TABLET ORAL at 08:43

## 2021-11-30 RX ADMIN — SODIUM CHLORIDE 125 ML/HR: 0.9 INJECTION, SOLUTION INTRAVENOUS at 08:44

## 2021-12-02 ENCOUNTER — APPOINTMENT (OUTPATIENT)
Dept: LAB | Age: 66
End: 2021-12-02
Payer: MEDICARE

## 2021-12-02 DIAGNOSIS — Z95.5 STATUS POST INSERTION OF DRUG ELUTING CORONARY ARTERY STENT: ICD-10-CM

## 2021-12-02 LAB
ANION GAP SERPL CALCULATED.3IONS-SCNC: 4 MMOL/L (ref 4–13)
BUN SERPL-MCNC: 14 MG/DL (ref 5–25)
CALCIUM SERPL-MCNC: 9.1 MG/DL (ref 8.3–10.1)
CHLORIDE SERPL-SCNC: 103 MMOL/L (ref 100–108)
CO2 SERPL-SCNC: 29 MMOL/L (ref 21–32)
CREAT SERPL-MCNC: 1.16 MG/DL (ref 0.6–1.3)
GFR SERPL CREATININE-BSD FRML MDRD: 65 ML/MIN/1.73SQ M
GLUCOSE SERPL-MCNC: 103 MG/DL (ref 65–140)
POTASSIUM SERPL-SCNC: 4.6 MMOL/L (ref 3.5–5.3)
SODIUM SERPL-SCNC: 136 MMOL/L (ref 136–145)

## 2021-12-02 PROCEDURE — 36415 COLL VENOUS BLD VENIPUNCTURE: CPT

## 2021-12-02 PROCEDURE — 80048 BASIC METABOLIC PNL TOTAL CA: CPT

## 2021-12-06 ENCOUNTER — TELEPHONE (OUTPATIENT)
Dept: CARDIOLOGY CLINIC | Facility: CLINIC | Age: 66
End: 2021-12-06

## 2021-12-07 ENCOUNTER — OFFICE VISIT (OUTPATIENT)
Dept: CARDIOLOGY CLINIC | Facility: CLINIC | Age: 66
End: 2021-12-07
Payer: MEDICARE

## 2021-12-07 VITALS
BODY MASS INDEX: 26.58 KG/M2 | WEIGHT: 175.4 LBS | SYSTOLIC BLOOD PRESSURE: 148 MMHG | HEART RATE: 78 BPM | OXYGEN SATURATION: 96 % | HEIGHT: 68 IN | DIASTOLIC BLOOD PRESSURE: 98 MMHG

## 2021-12-07 DIAGNOSIS — R06.02 SHORTNESS OF BREATH: ICD-10-CM

## 2021-12-07 DIAGNOSIS — Z95.5 STATUS POST INSERTION OF DRUG ELUTING CORONARY ARTERY STENT: Primary | ICD-10-CM

## 2021-12-07 DIAGNOSIS — I10 HYPERTENSION, UNSPECIFIED TYPE: ICD-10-CM

## 2021-12-07 DIAGNOSIS — E78.2 MIXED HYPERLIPIDEMIA: ICD-10-CM

## 2021-12-07 PROCEDURE — 99215 OFFICE O/P EST HI 40 MIN: CPT | Performed by: NURSE PRACTITIONER

## 2021-12-07 RX ORDER — METOPROLOL SUCCINATE 25 MG/1
12.5 TABLET, EXTENDED RELEASE ORAL DAILY
Qty: 30 TABLET | Refills: 0 | Status: SHIPPED | OUTPATIENT
Start: 2021-12-07 | End: 2021-12-07 | Stop reason: CLARIF

## 2021-12-07 RX ORDER — LISINOPRIL 20 MG/1
20 TABLET ORAL DAILY
Qty: 30 TABLET | Refills: 0 | Status: SHIPPED | OUTPATIENT
Start: 2021-12-07 | End: 2021-12-16 | Stop reason: SDUPTHER

## 2021-12-08 ENCOUNTER — HOSPITAL ENCOUNTER (OUTPATIENT)
Dept: NON INVASIVE DIAGNOSTICS | Facility: CLINIC | Age: 66
Discharge: HOME | End: 2021-12-08
Payer: MEDICARE

## 2021-12-08 ENCOUNTER — TELEPHONE (OUTPATIENT)
Dept: CARDIOLOGY CLINIC | Facility: CLINIC | Age: 66
End: 2021-12-08

## 2021-12-08 VITALS
WEIGHT: 175 LBS | DIASTOLIC BLOOD PRESSURE: 98 MMHG | HEIGHT: 68 IN | SYSTOLIC BLOOD PRESSURE: 148 MMHG | BODY MASS INDEX: 26.52 KG/M2 | HEART RATE: 80 BPM

## 2021-12-08 DIAGNOSIS — Z95.5 STATUS POST INSERTION OF DRUG ELUTING CORONARY ARTERY STENT: ICD-10-CM

## 2021-12-08 DIAGNOSIS — R06.02 SHORTNESS OF BREATH: ICD-10-CM

## 2021-12-08 LAB
AORTIC ROOT: 3.4 CM
APICAL FOUR CHAMBER EJECTION FRACTION: 55 %
AV REGURGITATION PRESSURE HALF TIME: 0.71 MS
E WAVE DECELERATION TIME: 242 MS
FRACTIONAL SHORTENING: 30 % (ref 28–44)
GLOBAL LONGITUIDAL STRAIN: -21 %
INTERVENTRICULAR SEPTUM IN DIASTOLE (PARASTERNAL SHORT AXIS VIEW): 1 CM
LEFT ATRIUM AREA SYSTOLE SINGLE PLANE A4C: 10.6 CM2
LEFT INTERNAL DIMENSION IN SYSTOLE: 1.9 CM (ref 2.1–4)
LEFT VENTRICULAR INTERNAL DIMENSION IN DIASTOLE: 2.7 CM (ref 4.64–6.91)
LEFT VENTRICULAR POSTERIOR WALL IN END DIASTOLE: 1 CM
LEFT VENTRICULAR STROKE VOLUME: 15 ML
MV E'TISSUE VEL-SEP: 10 CM/S
MV PEAK A VEL: 0.74 M/S
MV PEAK E VEL: 57 CM/S
MV STENOSIS PRESSURE HALF TIME: 0 MS
RIGHT ATRIUM AREA SYSTOLE A4C: 9.7 CM2
RIGHT VENTRICLE ID DIMENSION: 3.1 CM
SL CV AV DECELERATION TIME RETROGRADE: 2441 MS
SL CV AV PEAK GRADIENT RETROGRADE: 55 MMHG
SL CV LV EF: 60
SL CV PED ECHO LEFT VENTRICLE DIASTOLIC VOLUME (MOD BIPLANE) 2D: 26 ML
SL CV PED ECHO LEFT VENTRICLE SYSTOLIC VOLUME (MOD BIPLANE) 2D: 11 ML
TRICUSPID VALVE S': 0.6 CM/S
Z-SCORE OF LEFT VENTRICULAR DIMENSION IN END SYSTOLE: -7.41

## 2021-12-08 PROCEDURE — 93306 TTE W/DOPPLER COMPLETE: CPT

## 2021-12-08 PROCEDURE — 93356 MYOCRD STRAIN IMG SPCKL TRCK: CPT

## 2021-12-08 PROCEDURE — 93356 MYOCRD STRAIN IMG SPCKL TRCK: CPT | Performed by: INTERNAL MEDICINE

## 2021-12-08 PROCEDURE — 93306 TTE W/DOPPLER COMPLETE: CPT | Performed by: INTERNAL MEDICINE

## 2021-12-15 ENCOUNTER — HOSPITAL ENCOUNTER (OUTPATIENT)
Dept: RADIOLOGY | Facility: HOSPITAL | Age: 66
Discharge: HOME | End: 2021-12-15
Payer: MEDICARE

## 2021-12-15 DIAGNOSIS — R06.00 DOE (DYSPNEA ON EXERTION): ICD-10-CM

## 2021-12-15 PROCEDURE — 71046 X-RAY EXAM CHEST 2 VIEWS: CPT

## 2021-12-16 ENCOUNTER — OFFICE VISIT (OUTPATIENT)
Dept: INTERNAL MEDICINE CLINIC | Facility: CLINIC | Age: 66
End: 2021-12-16
Payer: MEDICARE

## 2021-12-16 VITALS
WEIGHT: 176.2 LBS | OXYGEN SATURATION: 98 % | HEIGHT: 68 IN | BODY MASS INDEX: 26.7 KG/M2 | TEMPERATURE: 98.9 F | SYSTOLIC BLOOD PRESSURE: 162 MMHG | DIASTOLIC BLOOD PRESSURE: 90 MMHG | HEART RATE: 92 BPM

## 2021-12-16 DIAGNOSIS — I10 HYPERTENSION, UNSPECIFIED TYPE: ICD-10-CM

## 2021-12-16 DIAGNOSIS — R07.89 OTHER CHEST PAIN: ICD-10-CM

## 2021-12-16 DIAGNOSIS — J45.20 MILD INTERMITTENT ASTHMA WITHOUT COMPLICATION: ICD-10-CM

## 2021-12-16 DIAGNOSIS — R06.00 DOE (DYSPNEA ON EXERTION): ICD-10-CM

## 2021-12-16 DIAGNOSIS — I10 ESSENTIAL HYPERTENSION: ICD-10-CM

## 2021-12-16 DIAGNOSIS — Z95.5 STATUS POST INSERTION OF DRUG ELUTING CORONARY ARTERY STENT: Primary | ICD-10-CM

## 2021-12-16 DIAGNOSIS — E78.2 MIXED HYPERLIPIDEMIA: ICD-10-CM

## 2021-12-16 PROCEDURE — 93000 ELECTROCARDIOGRAM COMPLETE: CPT | Performed by: FAMILY MEDICINE

## 2021-12-16 PROCEDURE — 99214 OFFICE O/P EST MOD 30 MIN: CPT | Performed by: FAMILY MEDICINE

## 2021-12-16 PROCEDURE — 94640 AIRWAY INHALATION TREATMENT: CPT

## 2021-12-16 RX ORDER — LEVALBUTEROL INHALATION SOLUTION 1.25 MG/3ML
1.25 SOLUTION RESPIRATORY (INHALATION) ONCE
Status: COMPLETED | OUTPATIENT
Start: 2021-12-16 | End: 2021-12-16

## 2021-12-16 RX ORDER — LISINOPRIL 40 MG/1
40 TABLET ORAL DAILY
Qty: 40 TABLET | Refills: 1 | Status: SHIPPED | OUTPATIENT
Start: 2021-12-16 | End: 2022-01-21 | Stop reason: SDUPTHER

## 2021-12-16 RX ORDER — ALBUTEROL SULFATE 2.5 MG/3ML
2.5 SOLUTION RESPIRATORY (INHALATION) EVERY 6 HOURS PRN
Qty: 100 ML | Refills: 0 | Status: SHIPPED | OUTPATIENT
Start: 2021-12-16

## 2021-12-16 RX ADMIN — LEVALBUTEROL INHALATION SOLUTION 1.25 MG: 1.25 SOLUTION RESPIRATORY (INHALATION) at 16:05

## 2021-12-16 NOTE — PROGRESS NOTES
Mini neb  Performed by: Paul Aguilar MA  Authorized by: Makayla Carr DO   Universal Protocol:  Consent: Verbal consent obtained      Number of treatments:  1  Treatment 1:   Pre-Procedure     Symptoms:  Difficulty breathing  Post-Procedure     Lung sounds:  Clear

## 2021-12-16 NOTE — PROGRESS NOTES
Assessment/Plan:    1  Status post insertion of drug eluting coronary artery stent  -     POCT ECG  -     CTA chest pe study; Future; Expected date: 12/16/2021    2  Other chest pain  -     POCT ECG  -     albuterol (2 5 mg/3 mL) 0 083 % nebulizer solution; Take 3 mL (2 5 mg total) by nebulization every 6 (six) hours as needed for wheezing or shortness of breath (Patient not taking: Reported on 1/21/2022 )  -     CTA chest pe study; Future; Expected date: 12/16/2021    3  LAFLEUR (dyspnea on exertion)  -     POCT ECG  -     albuterol (2 5 mg/3 mL) 0 083 % nebulizer solution; Take 3 mL (2 5 mg total) by nebulization every 6 (six) hours as needed for wheezing or shortness of breath (Patient not taking: Reported on 1/21/2022 )  -     CTA chest pe study; Future; Expected date: 12/16/2021    4  Mild intermittent asthma without complication  -     levalbuterol (XOPENEX) inhalation solution 1 25 mg  -     albuterol (2 5 mg/3 mL) 0 083 % nebulizer solution; Take 3 mL (2 5 mg total) by nebulization every 6 (six) hours as needed for wheezing or shortness of breath (Patient not taking: Reported on 1/21/2022 )  -     fluticasone-salmeterol (Advair Diskus) 500-50 mcg/dose inhaler; Inhale 1 puff 2 (two) times a day Rinse mouth after use  -     CTA chest pe study; Future; Expected date: 12/16/2021  -     Mini neb    5  Essential hypertension  -     CTA chest pe study; Future; Expected date: 12/16/2021    6  Mixed hyperlipidemia  -     CTA chest pe study; Future; Expected date: 12/16/2021    7  Hypertension, unspecified type            There are no Patient Instructions on file for this visit  Return if symptoms worsen or fail to improve, for Next scheduled follow up  Subjective:      Patient ID: Duglas Guevara is a 78 y o  male  Chief Complaint   Patient presents with    Shortness of Breath     needs EKG        HPI    Feels an element of shortness of breath  Not like his normal and no exacerbating factors with no chest pain  Had his stent placed and is trying to get hold of Cardiology to see if he needs to be seen in room sooner than his regular scheduled appointment  Is very active at home and has a lot of physical projects that he does  Not sure if he did anything during his exercising  No wheezing but has mild dyspnea on exertion    The following portions of the patient's history were reviewed and updated as appropriate: allergies, current medications, past family history, past medical history, past social history, past surgical history and problem list     Review of Systems      Constitutional:  Denies fever or chills   Eyes:  Denies double , blurry vision or eye pain  HENT:  Denies nasal congestion or sore throat   Respiratory:  Denies cough or shortness of breath or wheezing  Cardiovascular:  Denies palpitations or chest pain  GI:  Denies abdominal pain, nausea, or vomiting, no loose stools, no reflux  Integument:  Denies rash , no open areas  Neurologic:  Denies headache or focal weakness, no dizziness  : no dysuria, or hematuria      Current Outpatient Medications   Medication Sig Dispense Refill    albuterol (ProAir HFA) 90 mcg/act inhaler Inhale 1 puff every 6 (six) hours as needed for wheezing or shortness of breath 18 g 3    aspirin 81 mg chewable tablet Chew 1 tablet (81 mg total) daily 30 tablet 0    atorvastatin (LIPITOR) 40 mg tablet Take 1 tablet (40 mg total) by mouth daily 30 tablet 3    albuterol (2 5 mg/3 mL) 0 083 % nebulizer solution Take 3 mL (2 5 mg total) by nebulization every 6 (six) hours as needed for wheezing or shortness of breath (Patient not taking: Reported on 1/21/2022 ) 100 mL 0    fluticasone-salmeterol (Advair Diskus) 500-50 mcg/dose inhaler Inhale 1 puff 2 (two) times a day Rinse mouth after use   60 blister 5    lisinopril (ZESTRIL) 40 mg tablet Take 1 tablet (40 mg total) by mouth daily 90 tablet 3    ticagrelor (Brilinta) 90 MG Take 1 tablet (90 mg total) by mouth every 12 (twelve) hours 60 tablet 5     No current facility-administered medications for this visit         Objective:    /90 (BP Location: Left arm, Patient Position: Sitting, Cuff Size: Adult)   Pulse 92   Temp 98 9 °F (37 2 °C) (Tympanic)   Ht 5' 8" (1 727 m)   Wt 79 9 kg (176 lb 3 2 oz)   SpO2 98%   BMI 26 79 kg/m²        Physical Exam       Constitutional:  Well developed, well nourished, no acute distress, non-toxic appearance   Eyes:  PERRL, conjunctiva normal , non icteric sclera  HENT:  Atraumatic, oropharynx moist  Neck-  supple   Respiratory:  CTA b/l, normal breath sounds, no rales, no wheezing   Cardiovascular:  RRR, no murmurs, no LE edema b/l  GI:  Soft, nondistended, normal bowel sounds x 4, nontender, no organomegaly, no mass, no rebound, no guarding   Neurologic:  no focal deficits noted   Psychiatric:  Speech and behavior appropriate , AAO x 3        Teresa Herman DO

## 2021-12-17 ENCOUNTER — HOSPITAL ENCOUNTER (OUTPATIENT)
Dept: RADIOLOGY | Age: 66
Discharge: HOME | End: 2021-12-17
Payer: MEDICARE

## 2021-12-17 DIAGNOSIS — J45.20 MILD INTERMITTENT ASTHMA WITHOUT COMPLICATION: ICD-10-CM

## 2021-12-17 DIAGNOSIS — E78.2 MIXED HYPERLIPIDEMIA: ICD-10-CM

## 2021-12-17 DIAGNOSIS — Z95.5 STATUS POST INSERTION OF DRUG ELUTING CORONARY ARTERY STENT: ICD-10-CM

## 2021-12-17 DIAGNOSIS — R06.00 DOE (DYSPNEA ON EXERTION): ICD-10-CM

## 2021-12-17 DIAGNOSIS — I10 ESSENTIAL HYPERTENSION: ICD-10-CM

## 2021-12-17 DIAGNOSIS — R07.89 OTHER CHEST PAIN: ICD-10-CM

## 2021-12-17 PROCEDURE — G1004 CDSM NDSC: HCPCS

## 2021-12-17 PROCEDURE — 71275 CT ANGIOGRAPHY CHEST: CPT

## 2021-12-17 RX ADMIN — IOHEXOL 100 ML: 350 INJECTION, SOLUTION INTRAVENOUS at 08:29

## 2021-12-20 ENCOUNTER — TELEPHONE (OUTPATIENT)
Dept: CARDIOLOGY CLINIC | Facility: CLINIC | Age: 66
End: 2021-12-20

## 2021-12-20 DIAGNOSIS — Z95.5 STATUS POST INSERTION OF DRUG ELUTING CORONARY ARTERY STENT: Primary | ICD-10-CM

## 2021-12-22 ENCOUNTER — TELEPHONE (OUTPATIENT)
Dept: INTERNAL MEDICINE CLINIC | Age: 66
End: 2021-12-22

## 2022-01-18 ENCOUNTER — APPOINTMENT (OUTPATIENT)
Dept: LAB | Age: 67
End: 2022-01-18
Payer: MEDICARE

## 2022-01-18 DIAGNOSIS — Z12.5 SCREENING PSA (PROSTATE SPECIFIC ANTIGEN): ICD-10-CM

## 2022-01-18 DIAGNOSIS — E78.2 MIXED HYPERLIPIDEMIA: ICD-10-CM

## 2022-01-18 DIAGNOSIS — J31.0 CHRONIC RHINITIS: ICD-10-CM

## 2022-01-18 DIAGNOSIS — J30.2 OTHER SEASONAL ALLERGIC RHINITIS: ICD-10-CM

## 2022-01-18 LAB
ALBUMIN SERPL BCP-MCNC: 3.6 G/DL (ref 3.5–5)
ALP SERPL-CCNC: 60 U/L (ref 46–116)
ALT SERPL W P-5'-P-CCNC: 33 U/L (ref 12–78)
ANION GAP SERPL CALCULATED.3IONS-SCNC: 6 MMOL/L (ref 4–13)
AST SERPL W P-5'-P-CCNC: 19 U/L (ref 5–45)
BILIRUB SERPL-MCNC: 1.27 MG/DL (ref 0.2–1)
BUN SERPL-MCNC: 15 MG/DL (ref 5–25)
CALCIUM SERPL-MCNC: 9.2 MG/DL (ref 8.3–10.1)
CHLORIDE SERPL-SCNC: 108 MMOL/L (ref 100–108)
CHOLEST SERPL-MCNC: 176 MG/DL
CO2 SERPL-SCNC: 27 MMOL/L (ref 21–32)
CREAT SERPL-MCNC: 1.31 MG/DL (ref 0.6–1.3)
GFR SERPL CREATININE-BSD FRML MDRD: 55 ML/MIN/1.73SQ M
GLUCOSE P FAST SERPL-MCNC: 90 MG/DL (ref 65–99)
HDLC SERPL-MCNC: 49 MG/DL
LDLC SERPL CALC-MCNC: 102 MG/DL (ref 0–100)
NONHDLC SERPL-MCNC: 127 MG/DL
POTASSIUM SERPL-SCNC: 4.5 MMOL/L (ref 3.5–5.3)
PROT SERPL-MCNC: 7.2 G/DL (ref 6.4–8.2)
SODIUM SERPL-SCNC: 141 MMOL/L (ref 136–145)
TRIGL SERPL-MCNC: 127 MG/DL

## 2022-01-18 PROCEDURE — 80061 LIPID PANEL: CPT

## 2022-01-18 PROCEDURE — 86003 ALLG SPEC IGE CRUDE XTRC EA: CPT

## 2022-01-18 PROCEDURE — 36415 COLL VENOUS BLD VENIPUNCTURE: CPT

## 2022-01-18 PROCEDURE — 82785 ASSAY OF IGE: CPT

## 2022-01-18 PROCEDURE — 80053 COMPREHEN METABOLIC PANEL: CPT

## 2022-01-18 PROCEDURE — G0103 PSA SCREENING: HCPCS

## 2022-01-19 LAB
A ALTERNATA IGE QN: <0.1 KUA/I
A FUMIGATUS IGE QN: <0.1 KUA/I
ALLERGEN COMMENT: ABNORMAL
BERMUDA GRASS IGE QN: <0.1 KUA/I
BOXELDER IGE QN: <0.1 KUA/I
C HERBARUM IGE QN: <0.1 KUA/I
CAT DANDER IGE QN: 3.55 KUA/I
CMN PIGWEED IGE QN: <0.1 KUA/I
COMMON RAGWEED IGE QN: <0.1 KUA/I
COTTONWOOD IGE QN: <0.1 KUA/I
D FARINAE IGE QN: 0.18 KUA/I
D PTERONYSS IGE QN: 0.3 KUA/I
DOG DANDER IGE QN: 2.14 KUA/I
LONDON PLANE IGE QN: <0.1 KUA/I
MOUSE URINE PROT IGE QN: <0.1 KUA/I
MT JUNIPER IGE QN: <0.1 KUA/I
MUGWORT IGE QN: <0.1 KUA/I
P NOTATUM IGE QN: <0.1 KUA/I
PSA SERPL-MCNC: 1.9 NG/ML (ref 0–4)
ROACH IGE QN: 0.13 KUA/I
SHEEP SORREL IGE QN: <0.1 KUA/I
SILVER BIRCH IGE QN: <0.1 KUA/I
TIMOTHY IGE QN: <0.1 KUA/I
TOTAL IGE SMQN RAST: 1145 KU/L (ref 0–113)
WALNUT IGE QN: <0.1 KUA/I
WHITE ASH IGE QN: <0.1 KUA/I
WHITE ELM IGE QN: <0.1 KUA/I
WHITE MULBERRY IGE QN: <0.1 KUA/I
WHITE OAK IGE QN: <0.1 KUA/I

## 2022-01-21 ENCOUNTER — OFFICE VISIT (OUTPATIENT)
Dept: CARDIOLOGY CLINIC | Facility: CLINIC | Age: 67
End: 2022-01-21
Payer: MEDICARE

## 2022-01-21 VITALS
HEART RATE: 97 BPM | HEIGHT: 68 IN | BODY MASS INDEX: 26.58 KG/M2 | WEIGHT: 175.4 LBS | DIASTOLIC BLOOD PRESSURE: 60 MMHG | SYSTOLIC BLOOD PRESSURE: 140 MMHG

## 2022-01-21 DIAGNOSIS — I25.10 CORONARY ARTERY DISEASE INVOLVING NATIVE CORONARY ARTERY OF NATIVE HEART WITHOUT ANGINA PECTORIS: ICD-10-CM

## 2022-01-21 DIAGNOSIS — Z95.5 STATUS POST INSERTION OF DRUG ELUTING CORONARY ARTERY STENT: ICD-10-CM

## 2022-01-21 DIAGNOSIS — E78.2 MIXED HYPERLIPIDEMIA: ICD-10-CM

## 2022-01-21 DIAGNOSIS — I10 HYPERTENSION, UNSPECIFIED TYPE: ICD-10-CM

## 2022-01-21 DIAGNOSIS — I10 ESSENTIAL HYPERTENSION: Primary | ICD-10-CM

## 2022-01-21 PROCEDURE — 99214 OFFICE O/P EST MOD 30 MIN: CPT | Performed by: INTERNAL MEDICINE

## 2022-01-21 RX ORDER — LISINOPRIL 40 MG/1
40 TABLET ORAL DAILY
Qty: 90 TABLET | Refills: 3 | Status: SHIPPED | OUTPATIENT
Start: 2022-01-21

## 2022-01-21 NOTE — PROGRESS NOTES
Cardiology Consultation     Otis Hobbs  [id number]  1955  HEART & VASCULAR [facility] [place] [id number]    1  Essential hypertension     2  Status post insertion of drug eluting coronary artery stent  lisinopril (ZESTRIL) 40 mg tablet   3  Mixed hyperlipidemia  lisinopril (ZESTRIL) 40 mg tablet   4  Coronary artery disease involving native coronary artery of native heart without angina pectoris     5  Hypertension, unspecified type  lisinopril (ZESTRIL) 40 mg tablet     Chief Complaint   Patient presents with    Follow-up     4 wk f/u per audrey with Echo ~ No cardiac complaints     HPI: Patient feels well, without complaints other than continued mild SOB that is continued, even after stent and stopping metoprolol  No reported chest pain, palpitations, lightheadedness, syncope, LE edema, orthopnea, PND, or significant weight changes  Patient remains active without any increased fatigue out of the ordinary        Patient Active Problem List   Diagnosis    Screening for metabolic disorder    Encounter for hepatitis C screening test for low risk patient    Mild intermittent asthma without complication    Seasonal allergies    Medicare annual wellness visit, initial    Mixed hyperlipidemia    LAFLEUR (dyspnea on exertion)    Family history of early CAD    Dermatitis    Atypical pigmented skin lesion    Seborrheic keratoses    Other chest pain    Status post insertion of drug eluting coronary artery stent    Essential hypertension    Coronary artery disease involving native coronary artery of native heart without angina pectoris     Past Medical History:   Diagnosis Date    Acute asthmatic bronchitis     Acute frontal sinusitis     Allergic     Asthma     History of acute pharyngitis     History of allergy     History of osteopenia     History of right inguinal hernia     History of seborrheic keratosis     History of umbilical hernia     Preop examination      Social History     Socioeconomic History    Marital status: /Civil Union     Spouse name: Not on file    Number of children: Not on file    Years of education: Not on file    Highest education level: Not on file   Occupational History    Not on file   Tobacco Use    Smoking status: Former Smoker     Packs/day: 0 00     Years: 0 00     Pack years: 0 00     Quit date:      Years since quittin 0    Smokeless tobacco: Never Used   Vaping Use    Vaping Use: Never used   Substance and Sexual Activity    Alcohol use: Yes     Alcohol/week: 0 0 standard drinks     Comment: rare    Drug use: No    Sexual activity: Yes   Other Topics Concern    Not on file   Social History Narrative    Not on file     Social Determinants of Health     Financial Resource Strain: Not on file   Food Insecurity: Not on file   Transportation Needs: Not on file   Physical Activity: Not on file   Stress: Not on file   Social Connections: Not on file   Intimate Partner Violence: Not on file   Housing Stability: Not on file      Family History   Problem Relation Age of Onset    Cancer Father         Adenoid Cystic Carcinoma    Heart disease Family     Multiple myeloma Mother     Heart disease Maternal Grandfather      Past Surgical History:   Procedure Laterality Date    APPENDECTOMY      CARDIAC CATHETERIZATION Left 2021    Procedure: Cardiac Left Heart Cath;  Surgeon: Maria Luz Robledo MD;  [facility]; Service: Cardiology    CARDIAC CATHETERIZATION N/A 2021    Procedure: Cardiac pci;  Surgeon: Maria Luz Robledo MD;  [facility];   Service: Cardiology    INGUINAL HERNIA REPAIR      UMBILICAL HERNIA REPAIR         Current Outpatient Medications:     albuterol (ProAir HFA) 90 mcg/act inhaler, Inhale 1 puff every 6 (six) hours as needed for wheezing or shortness of breath, Disp: 18 g, Rfl: 3   aspirin 81 mg chewable tablet, Chew 1 tablet (81 mg total) daily, Disp: 30 tablet, Rfl: 0    atorvastatin (LIPITOR) 40 mg tablet, Take 1 tablet (40 mg total) by mouth daily, Disp: 30 tablet, Rfl: 3    fluticasone-salmeterol (Advair Diskus) 500-50 mcg/dose inhaler, Inhale 1 puff 2 (two) times a day Rinse mouth after use , Disp: 60 blister, Rfl: 5    lisinopril (ZESTRIL) 40 mg tablet, Take 1 tablet (40 mg total) by mouth daily, Disp: 90 tablet, Rfl: 3    ticagrelor (Brilinta) 90 MG, Take 1 tablet (90 mg total) by mouth every 12 (twelve) hours, Disp: 60 tablet, Rfl: 5    albuterol (2 5 mg/3 mL) 0 083 % nebulizer solution, Take 3 mL (2 5 mg total) by nebulization every 6 (six) hours as needed for wheezing or shortness of breath (Patient not taking: Reported on 1/21/2022 ), Disp: 100 mL, Rfl: 0  Allergies   Allergen Reactions    Erythromycin GI Intolerance    Pollen Extract      Vitals:    01/21/22 0818   BP: 140/60   BP Location: Right arm   Patient Position: Sitting   Cuff Size: Standard   Pulse: 97   Weight: 79 6 kg (175 lb 6 4 oz)   Height: 5' 8" (1 727 m)       Labs:  Appointment on 01/18/2022   Component Date Value    Cholesterol 01/18/2022 176     Triglycerides 01/18/2022 127     HDL, Direct 01/18/2022 49     LDL Calculated 01/18/2022 102*    Non-HDL-Chol (CHOL-HDL) 01/18/2022 127     Sodium 01/18/2022 141     Potassium 01/18/2022 4 5     Chloride 01/18/2022 108     CO2 01/18/2022 27     ANION GAP 01/18/2022 6     BUN 01/18/2022 15     Creatinine 01/18/2022 1 31*    Glucose, Fasting 01/18/2022 90     Calcium 01/18/2022 9 2     AST 01/18/2022 19     ALT 01/18/2022 33     Alkaline Phosphatase 01/18/2022 60     Total Protein 01/18/2022 7 2     Albumin 01/18/2022 3 6     Total Bilirubin 01/18/2022 1 27*    eGFR 01/18/2022 55     PSA 01/18/2022 1 9     A  ALTERNATA 01/18/2022 <0 10     A  FUMIGATUS 01/18/2022 <0 10     B[place] Grass 01/18/2022 <0 10     Box Elder  01/18/2022 <0 10     Cat Epithellium-Dander 01/18/2022 3 55*    C  HERBARUM 01/18/2022 <0 10     Cockroach 01/18/2022 0 13*    Common Silver [place] Barnacle 01/18/2022 <0 10     [place] 01/18/2022 <0 10     D  farinae 01/18/2022 0 18*    D  pteronyssinus 01/18/2022 0 30*    Dog Dander 01/18/2022 2 14*    Elm IgE 01/18/2022 <0 10    [address] [facility] [address] Tree 01/18/2022 <0 10     Mugwort 01/18/2022 <0 10     [place] Tree 01/18/2022 <0 10     Oak 01/18/2022 <0 10     P CHRYSOGENUM 01/18/2022 <0 10     Rough Pigweed  IgE 01/18/2022 <0 10     Common Ragweed 01/18/2022 <0 10     Sheep Edson IgE 01/18/2022 <0 10     [place] Tree 01/18/2022 <0 10     Mike Grass 01/18/2022 <0 10     [employer] Tree 01/18/2022 <0 10     [place] Tree 01/18/2022 <0 10     IgE 01/18/2022 1,145*    Allergen Comment 01/18/2022 See Below     MOUSE URINE 01/18/2022 <0 10    Hospital Outpatient Visit on 12/08/2021   Component Date Value    A4C EF 12/08/2021 55     LVIDd 12/08/2021 2 70     LVIDS 12/08/2021 1 90     IVSd 12/08/2021 1 00     LVPWd 12/08/2021 1 00     FS 12/08/2021 30     MV E' Tissue Velocity Se* 12/08/2021 10     E wave deceleration time 12/08/2021 242     MV Peak E Elijah 12/08/2021 57     MV Peak A Elijah 12/08/2021 0 74     RVID d 12/08/2021 3 1     TV S' 12/08/2021 0 6     SP A4C 12/08/2021 10 6     RAA A4C 12/08/2021 9 7     AV peak gradient 12/08/2021 55     AV Deceleration Time 12/08/2021 2,441     AV regurgitation pressur* 12/08/2021 0 708     MV stenosis pressure 1/2* 12/08/2021 0     Ao root 12/08/2021 3 40     Left ventricular stroke * 12/08/2021 15 00     LEFT VENTRICLE SYSTOLIC * 36/94/0668 11     LV DIASTOLIC VOLUME (MOD* 04/58/1586 26     ZLVIDS 12/08/2021 -7 41     GLS 12/08/2021 -21     LV EF 12/08/2021 60    Appointment on 12/02/2021   Component Date Value    Sodium 12/02/2021 136     Potassium 12/02/2021 4 6     Chloride 12/02/2021 103     CO2 12/02/2021 29     ANION GAP 12/02/2021 4     BUN 12/02/2021 14     Creatinine 12/02/2021 1 16     Glucose 12/02/2021 103     Calcium 12/02/2021 9 1     eGFR 12/02/2021 65    Admission on 11/30/2021, Discharged on 11/30/2021   Component Date Value    Activated Clotting Time,* 11/30/2021 225*    Specimen Type 11/30/2021 VENOUS    Appointment on 11/24/2021   Component Date Value    WBC 11/24/2021 7 49     RBC 11/24/2021 5 83*    Hemoglobin 11/24/2021 17 9*    Hematocrit 11/24/2021 54 8*    MCV 11/24/2021 94     MCH 11/24/2021 30 7     MCHC 11/24/2021 32 7     RDW 11/24/2021 12 6     MPV 11/24/2021 9 6     Platelets 26/98/4951 282     nRBC 11/24/2021 0     Neutrophils Relative 11/24/2021 56     Immat GRANS % 11/24/2021 0     Lymphocytes Relative 11/24/2021 28     Monocytes Relative 11/24/2021 11     Eosinophils Relative 11/24/2021 4     Basophils Relative 11/24/2021 1     Neutrophils Absolute 11/24/2021 4 20     Immature Grans Absolute 11/24/2021 0 01     Lymphocytes Absolute 11/24/2021 2 09     Monocytes Absolute 11/24/2021 0 81     Eosinophils Absolute 11/24/2021 0 30     Basophils Absolute 11/24/2021 0 08     Sodium 11/24/2021 138     Potassium 11/24/2021 4 5     Chloride 11/24/2021 105     CO2 11/24/2021 30     ANION GAP 11/24/2021 3*    BUN 11/24/2021 18     Creatinine 11/24/2021 1 24     Glucose, Fasting 11/24/2021 85     Calcium 11/24/2021 8 9     AST 11/24/2021 20     ALT 11/24/2021 30     Alkaline Phosphatase 11/24/2021 57     Total Protein 11/24/2021 7 2     Albumin 11/24/2021 3 8     Total Bilirubin 11/24/2021 0 62     eGFR 11/24/2021 [facility] Outpatient Visit on 11/18/2021   Component Date Value    Protocol Name 11/18/2021 KEITH     Time In Exercise Phase 11/18/2021 00:05:30     MAX   SYSTOLIC BP 70/08/1272 161     Max Diastolic Bp 43/34/8184 90     Max Heart Rate 11/18/2021 144     Max Predicted Heart Rate 11/18/2021 154     Reason for Termination 11/18/2021                      Value:  Dyspnea  Test Indication 11/18/2021 Chest Discomfort     Target Hr Formular 11/18/2021 (220 - Age)*100%     Chest Pain Statement 11/18/2021 none    Hospital Outpatient Visit on 11/18/2021   Component Date Value    Protocol Name 11/18/2021 KEITH     Time In Exercise Phase 11/18/2021 00:05:30     MAX  SYSTOLIC BP 45/71/3812 113     Max Diastolic Bp 94/03/8687 90     Max Heart Rate 11/18/2021 144     Max Predicted Heart Rate 11/18/2021 154     Reason for Termination 11/18/2021                      Value:  Dyspnea      Test Indication 11/18/2021 Chest Discomfort     Target Hr Formular 11/18/2021 (220 - Age)*100%     Chest Pain Statement 11/18/2021 none    Hospital Outpatient Visit on 11/18/2021   Component Date Value    Baseline HR 11/18/2021 84     Baseline BP 11/18/2021 140/78     O2 sat rest 11/18/2021 98     Stress peak HR 11/18/2021 144     Post peak BP 11/18/2021 210     Rate Pressure Product 11/18/2021 30,240 0     O2 sat peak 11/18/2021 96     Recovery HR 11/18/2021 93     Recovery BP 11/18/2021 146/98     O2 sat recovery 11/18/2021 96     Target HR 11/18/2021 144     Percent HR 11/18/2021 93     Exercise duration (min) 11/18/2021 5     Exercise duration (sec) 11/18/2021 30     Estimated workload 11/18/2021 7 0     Angina Index 11/18/2021 1     Stress Stage Reached 11/18/2021 2 0     Max HR Percent 11/18/2021 93     Max HR 11/18/2021 131     Rest Nuclear Isotope Dose 11/18/2021 10 58     Stress Nuclear Isotope D* 11/18/2021 32 20     Duke Treadmill Score 11/18/2021 -14     Base ST Depresion (mm) 11/18/2021 0     ST Depression (mm) 11/18/2021 3 0     Stress/rest perfusion ra* 11/18/2021 1 03     EF (%) 11/18/2021 [address] Outpatient Visit on 09/20/2021   Component Date Value    Protocol Name 09/20/2021 KEITH     Time In Exercise Phase 09/20/2021 00:05:03     MAX   SYSTOLIC BP 73/18/9157 244     Max Diastolic Bp 71/72/5284 436     Max Heart Rate 09/20/2021 148     Max Predicted Heart Rate 09/20/2021 154     Reason for Termination 09/20/2021 Fatigue     Test Indication 09/20/2021 Chest Discomfort     Target Hr Formular 09/20/2021 (220 - Age)*100%     Arrhy During Ex 09/20/2021 ventricular premature beats-isolated     Chest Pain Statement 09/20/2021 none    Appointment on 09/01/2021   Component Date Value    Cholesterol 09/01/2021 228*    Triglycerides 09/01/2021 190*    HDL, Direct 09/01/2021 48     LDL Calculated 09/01/2021 142*    Non-HDL-Chol (CHOL-HDL) 09/01/2021 180     Sodium 09/01/2021 140     Potassium 09/01/2021 4 4     Chloride 09/01/2021 109*    CO2 09/01/2021 31     ANION GAP 09/01/2021 0*    BUN 09/01/2021 17     Creatinine 09/01/2021 1 14     Glucose, Fasting 09/01/2021 81     Calcium 09/01/2021 8 6     AST 09/01/2021 18     ALT 09/01/2021 27     Alkaline Phosphatase 09/01/2021 66     Total Protein 09/01/2021 6 9     Albumin 09/01/2021 3 5     Total Bilirubin 09/01/2021 0 52     eGFR 09/01/2021 67     Vit D, 25-Hydroxy 09/01/2021 69 1     CRP 09/01/2021 <3 0     WBC 09/01/2021 8 00     RBC 09/01/2021 5 57     Hemoglobin 09/01/2021 17 3*    Hematocrit 09/01/2021 53 0*    MCV 09/01/2021 95     MCH 09/01/2021 31 1     MCHC 09/01/2021 32 6     RDW 09/01/2021 12 3     MPV 09/01/2021 9 2     Platelets 08/91/4816 268     nRBC 09/01/2021 0     Neutrophils Relative 09/01/2021 58     Immat GRANS % 09/01/2021 0     Lymphocytes Relative 09/01/2021 25     Monocytes Relative 09/01/2021 10     Eosinophils Relative 09/01/2021 6     Basophils Relative 09/01/2021 1     Neutrophils Absolute 09/01/2021 4 54     Immature Grans Absolute 09/01/2021 0 02     Lymphocytes Absolute 09/01/2021 2 01     Monocytes Absolute 09/01/2021 0 83     Eosinophils Absolute 09/01/2021 0 49     Basophils Absolute 09/01/2021 0 11*   There may be more visits with results that are not included       Lab Results   Component Value Date    TRIG 127 01/18/2022 TRIG 195 (H) 02/05/2019    HDL 49 01/18/2022    HDL 46 02/05/2019     Imaging: No results found  Review of Systems:  Review of Systems   Constitutional: Negative for activity change, appetite change, fatigue and fever  HENT: Negative for nosebleeds and sore throat  Eyes: Negative for photophobia and visual disturbance  Respiratory: Positive for shortness of breath  Negative for cough, chest tightness and wheezing  Cardiovascular: Negative for chest pain, palpitations and leg swelling  Gastrointestinal: Negative for abdominal pain, diarrhea, nausea and vomiting  Endocrine: Negative for polyuria  Genitourinary: Negative for dysuria, frequency and hematuria  Musculoskeletal: Negative for arthralgias, back pain and gait problem  Skin: Negative for pallor and rash  Neurological: Negative for dizziness, syncope, speech difficulty and light-headedness  Hematological: Does not bruise/bleed easily  Psychiatric/Behavioral: Negative for agitation, behavioral problems and confusion  Physical Exam:  Physical Exam  Vitals reviewed  Constitutional:       General: He is not in acute distress  Appearance: He is well-developed  He is not diaphoretic  HENT:      Head: Normocephalic and atraumatic  Nose: Nose normal    Eyes:      General: No scleral icterus  Pupils: Pupils are equal, round, and reactive to light  Neck:      Vascular: No JVD  Cardiovascular:      Rate and Rhythm: Normal rate and regular rhythm  Heart sounds: S1 normal and S2 normal  Heart sounds not distant  No murmur heard  No systolic murmur is present  No friction rub  No gallop  No S3 sounds  Pulmonary:      Effort: Pulmonary effort is normal  No respiratory distress  Breath sounds: Normal breath sounds  No wheezing or rales  Abdominal:      General: Bowel sounds are normal  There is no distension  Palpations: Abdomen is soft  Musculoskeletal:         General: No deformity  Cervical back: Normal range of motion and neck supple  Skin:     General: Skin is warm and dry  Findings: No erythema  Neurological:      Mental Status: He is alert and oriented to person, place, and time  Cranial Nerves: No cranial nerve deficit  Psychiatric:         Behavior: Behavior normal        Blood pressure 140/60, pulse 97, height 5' 8" (1 727 m), weight 79 6 kg (175 lb 6 4 oz)  EKG:  Normal sinus rhythm  Incomplete right bundle branch block  ST & T wave abnormality, consider inferior ischemia  Abnormal ECG    Discussion/Summary:  CAD: He had an echo to assess for structural heart disease, which did not find any significant findings  He had a CCS to assess overall risk - which was 379, 76th percentile  Nuclear stress test was abnormal and so now s/p cardiac cath with FLORENTINO to 95% LCx lesion  Residual 40% lesion seen in RCA for medical management  He remained fatigued and short of breath at last visit, so B-blocker was discontinued considering asthma and symptoms seem to be improved  Continued on ASA, statin, Brilinta - switched from plavix due to fatigue with plavix  HLD: was diet controlled, , which was elevated in Sept 2021  CCS is moderate along with CAD seen on cath, so would benefit from a statin - tolerating Lipitor  LDL now 102 in Jan 2022  HTN: intolerant of B-blocker due to asthma, doing well since change to lisinopril

## 2022-01-21 NOTE — PATIENT INSTRUCTIONS
Coronary Artery Disease   AMBULATORY CARE:   Coronary artery disease (CAD)  is narrowing of the arteries to your heart caused by a buildup of plaque  Plaque is made up of cholesterol and other substances  The narrowing in your arteries decreases the amount of blood that can flow to your heart  This causes your heart to get less oxygen, which may be life-threatening  You may not have any symptoms of CAD  It is important for you to manage CAD even if you feel well  CAD can lead to a heart attack if it is not managed  Common symptoms include the following:   · Chest pain (angina), causing burning, squeezing, or crushing tightness in your chest    · Pain that spreads to your neck, jaw, or shoulder blade    · Nausea, vomiting, sweating, fainting, and hands and feet that are cold to the touch    Call your local emergency number (911 in the [place]), or have someone call if:   · You have any of the following signs of a heart attack:      ? Squeezing, pressure, or pain in your chest    ? You may  also have any of the following:     § Discomfort or pain in your back, neck, jaw, stomach, or arm    § Shortness of breath    § Nausea or vomiting    § Lightheadedness or a sudden cold sweat      Seek care immediately if:   · You have chest pain that happens often  · You have chest pain at rest     Call your doctor if:   · You have questions or concerns about your condition or care  Medicines used to treat CAD:  You may  need any of the following:  · Blood pressure medicines  are given to lower your blood pressure  These medicines may include ACE inhibitors and beta-blockers  ACE inhibitors help keep your blood vessels relaxed and open  This helps keep blood flowing into your heart  Beta-blockers keep your heart pumping strongly and regularly  This helps keep your heart from working too hard to get oxygen  · Cholesterol medicines  help lower blood cholesterol levels      · Nitrates , such as nitroglycerin, relax the arteries of your heart so it gets more oxygen  Nitrates may also help relieve your chest pain  · Diuretics  help your body get rid of extra fluid and protect your heart from more damage  You may urinate more often while you are taking diuretics  · Antiplatelets , such as aspirin, help prevent blood clots  Take your antiplatelet medicine exactly as directed  These medicines make it more likely for you to bleed or bruise  If you are told to take aspirin, do not take acetaminophen or ibuprofen instead  · Blood thinners  keep clots from forming in your blood  Clots may cause heart attacks, strokes, or death  This medicine makes it more likely for you to bleed or bruise  · Do not take certain medicines without asking your healthcare provider first   These include NSAIDs, herbal or vitamin supplements, or hormones (estrogen or progestin)  Procedures used to treat CAD:   · An angioplasty  may be done to open an artery blocked by plaque  A tube with a balloon on the end is put into the blocked artery  When the tube is in the artery, the balloon is inflated  As the balloon inflates, it presses the plaque against the artery wall to open the artery  A stent may be placed in your artery to keep it open  · Coronary artery bypass surgery (CABG)  is open heart surgery  Healthcare providers take arteries or veins from other areas in your body  These are used to bypass (go around) the blocked arteries of your heart  Cardiac rehabilitation (rehab)  is a program run by specialists who will help you safely strengthen your heart and reduce the risk for more heart disease  The plan includes exercise, relaxation, stress management, and heart-healthy nutrition  Healthcare providers will also check to make sure any medicines you are taking are working  Manage CAD to prevent a heart attack:   · Do not smoke  Nicotine and other chemicals in cigarettes and cigars can cause heart and lung damage   Ask your healthcare provider for information if you currently smoke and need help to quit  E-cigarettes or smokeless tobacco still contain nicotine  Talk to your healthcare provider before you use these products  · Be physically active  Physical activity, such as exercise, can lower your blood pressure, cholesterol, weight, and blood sugar levels  Healthcare providers will help you create physical activity goals  They can also help you make a plan to reach your goals  For example, you can break activity into 10-minute periods, 3 times in the day  Find activities you enjoy  This will make it easier for you to reach your goals  · Maintain a healthy weight  If you are overweight, talk to your healthcare provider about how to lose weight  A weight loss of 10% can improve your heart health  · Eat heart healthy foods  Include fresh fruits and vegetables in your meal plan  Choose low-fat foods, such as skim or 1% fat milk, low-fat cheese and yogurt, fish, chicken (without skin), and lean meats  Eat two 4-ounce servings of fish high in omega-3 fats each week, such as salmon, fresh tuna, and herring  Avoid foods that are high in sodium, such as canned foods, potato chips, salty snacks, and cold cuts  Put less table salt on your food  · Limit or do not drink alcohol  A drink of alcohol is 12 ounces of beer, 5 ounces of wine, or 1½ ounces of liquor  Your healthcare provider can tell you how many drinks are okay within 24 hours and within 1 week  · Manage other health conditions  Follow your healthcare provider's advice on how to manage other conditions that can affect your heart health  These include diabetes, high blood pressure, and high cholesterol  You may need to take medicines for these conditions and make other lifestyle changes  · Manage stress  Stress can raise your blood pressure  Find new ways to relax, such as deep breathing or listening to music  · Ask about vaccines you may need  Vaccines help prevent certain diseases that can be dangerous for a person with CAD  Get a flu vaccine as soon as recommended each year, usually in September or October  You may also need vaccines to prevent pneumonia or COVID-19  Your healthcare provider can tell you if you should get other vaccines, and when to get them  Follow up with your doctor as directed: You may need to return for other tests  You may also be referred to a cardiac surgeon  Write down your questions so you remember to ask them during your visits  © Copyright [employer] 2021 Information is for End User's use only and may not be sold, redistributed or otherwise used for commercial purposes  All illustrations and images included in CareNotes® are the copyrighted property of [employer]  or [facility] Christiano Riley   The above information is an  only  It is not intended as medical advice for individual conditions or treatments  Talk to your doctor, nurse or pharmacist before following any medical regimen to see if it is safe and effective for you

## 2022-04-28 ENCOUNTER — TELEPHONE (OUTPATIENT)
Dept: INTERNAL MEDICINE CLINIC | Age: 67
End: 2022-04-28

## 2022-06-23 ENCOUNTER — TELEPHONE (OUTPATIENT)
Dept: INTERNAL MEDICINE CLINIC | Age: 67
End: 2022-06-23

## 2022-06-23 DIAGNOSIS — J45.20 MILD INTERMITTENT ASTHMA WITHOUT COMPLICATION: Primary | ICD-10-CM

## 2022-06-23 RX ORDER — ALBUTEROL SULFATE 90 UG/1
2 AEROSOL, METERED RESPIRATORY (INHALATION) EVERY 6 HOURS PRN
Qty: 18 G | Refills: 3 | Status: SHIPPED | OUTPATIENT
Start: 2022-06-23 | End: 2022-10-24 | Stop reason: SDUPTHER

## 2022-06-23 NOTE — TELEPHONE ENCOUNTER
Insurance is not covering the  inhaler  They are stating if we can switch to Ventolin Inhaler      Will send to Dr Corey Jay to see if she can switch it for him    Scanning into media

## 2022-07-11 DIAGNOSIS — Z95.5 STATUS POST INSERTION OF DRUG ELUTING CORONARY ARTERY STENT: ICD-10-CM

## 2022-08-24 DIAGNOSIS — E78.2 MIXED HYPERLIPIDEMIA: ICD-10-CM

## 2022-08-24 DIAGNOSIS — Z82.49 FAMILY HISTORY OF EARLY CAD: ICD-10-CM

## 2022-08-24 RX ORDER — ATORVASTATIN CALCIUM 40 MG/1
TABLET, FILM COATED ORAL
Qty: 30 TABLET | Refills: 0 | Status: SHIPPED | OUTPATIENT
Start: 2022-08-24 | End: 2022-10-07

## 2022-09-06 ENCOUNTER — RA CDI HCC (OUTPATIENT)
Dept: OTHER | Facility: HOSPITAL | Age: 67
End: 2022-09-06

## 2022-09-06 NOTE — PROGRESS NOTES
[address]  coding opportunities       Chart reviewed, no opportunity found: CHART REVIEWED, NO OPPORTUNITY FOUND        Patients Insurance     Medicare Insurance: Medicare

## 2022-09-19 ENCOUNTER — OFFICE VISIT (OUTPATIENT)
Dept: INTERNAL MEDICINE CLINIC | Facility: CLINIC | Age: 67
End: 2022-09-19
Payer: MEDICARE

## 2022-09-19 ENCOUNTER — TELEPHONE (OUTPATIENT)
Dept: CARDIOLOGY CLINIC | Facility: CLINIC | Age: 67
End: 2022-09-19

## 2022-09-19 VITALS
WEIGHT: 175.4 LBS | BODY MASS INDEX: 27.53 KG/M2 | HEIGHT: 67 IN | OXYGEN SATURATION: 97 % | SYSTOLIC BLOOD PRESSURE: 160 MMHG | DIASTOLIC BLOOD PRESSURE: 90 MMHG | HEART RATE: 85 BPM | TEMPERATURE: 98.9 F

## 2022-09-19 DIAGNOSIS — I10 ESSENTIAL HYPERTENSION: ICD-10-CM

## 2022-09-19 DIAGNOSIS — Z12.12 ENCOUNTER FOR COLORECTAL CANCER SCREENING: Primary | ICD-10-CM

## 2022-09-19 DIAGNOSIS — Z00.00 MEDICARE ANNUAL WELLNESS VISIT, SUBSEQUENT: ICD-10-CM

## 2022-09-19 DIAGNOSIS — E78.2 MIXED HYPERLIPIDEMIA: ICD-10-CM

## 2022-09-19 DIAGNOSIS — Z82.49 FAMILY HISTORY OF EARLY CAD: ICD-10-CM

## 2022-09-19 DIAGNOSIS — J45.20 MILD INTERMITTENT ASTHMA WITHOUT COMPLICATION: ICD-10-CM

## 2022-09-19 DIAGNOSIS — M94.9 DISORDER OF CARTILAGE, UNSPECIFIED: ICD-10-CM

## 2022-09-19 DIAGNOSIS — I25.10 CORONARY ARTERY DISEASE INVOLVING NATIVE CORONARY ARTERY OF NATIVE HEART WITHOUT ANGINA PECTORIS: ICD-10-CM

## 2022-09-19 DIAGNOSIS — R14.0 DISTENDED ABDOMEN: ICD-10-CM

## 2022-09-19 DIAGNOSIS — R06.09 DOE (DYSPNEA ON EXERTION): ICD-10-CM

## 2022-09-19 DIAGNOSIS — J30.1 SEASONAL ALLERGIC RHINITIS DUE TO POLLEN: ICD-10-CM

## 2022-09-19 DIAGNOSIS — R53.83 OTHER FATIGUE: ICD-10-CM

## 2022-09-19 DIAGNOSIS — N18.31 STAGE 3A CHRONIC KIDNEY DISEASE (HCC): ICD-10-CM

## 2022-09-19 DIAGNOSIS — Z12.11 ENCOUNTER FOR COLORECTAL CANCER SCREENING: Primary | ICD-10-CM

## 2022-09-19 PROBLEM — R07.89 OTHER CHEST PAIN: Status: RESOLVED | Noted: 2021-11-10 | Resolved: 2022-09-19

## 2022-09-19 PROBLEM — L30.9 DERMATITIS: Status: RESOLVED | Noted: 2021-01-28 | Resolved: 2022-09-19

## 2022-09-19 PROCEDURE — 93000 ELECTROCARDIOGRAM COMPLETE: CPT | Performed by: FAMILY MEDICINE

## 2022-09-19 PROCEDURE — G0439 PPPS, SUBSEQ VISIT: HCPCS | Performed by: FAMILY MEDICINE

## 2022-09-19 PROCEDURE — 99214 OFFICE O/P EST MOD 30 MIN: CPT | Performed by: FAMILY MEDICINE

## 2022-09-19 RX ORDER — MONTELUKAST SODIUM 10 MG/1
10 TABLET ORAL
Qty: 30 TABLET | Refills: 5 | Status: SHIPPED | OUTPATIENT
Start: 2022-09-19

## 2022-09-19 RX ORDER — AMOXICILLIN 500 MG/1
500 CAPSULE ORAL 3 TIMES DAILY
COMMUNITY
Start: 2022-09-17 | End: 2022-10-24 | Stop reason: ALTCHOICE

## 2022-09-19 RX ORDER — AMLODIPINE BESYLATE 5 MG/1
5 TABLET ORAL DAILY
Qty: 30 TABLET | Refills: 5 | Status: SHIPPED | OUTPATIENT
Start: 2022-09-19 | End: 2022-10-20 | Stop reason: SDUPTHER

## 2022-09-19 RX ORDER — IBUPROFEN 800 MG/1
800 TABLET ORAL EVERY 8 HOURS PRN
COMMUNITY
Start: 2022-09-17 | End: 2022-10-24 | Stop reason: ALTCHOICE

## 2022-09-19 NOTE — PATIENT INSTRUCTIONS
Medicare Preventive Visit Patient Instructions  Thank you for completing your Welcome to Medicare Visit or Medicare Annual Wellness Visit today  Your next wellness visit will be due in one year (9/20/2023)  The screening/preventive services that you may require over the next 5-10 years are detailed below  Some tests may not apply to you based off risk factors and/or age  Screening tests ordered at today's visit but not completed yet may show as past due  Also, please note that scanned in results may not display below  Preventive Screenings:  Service Recommendations Previous Testing/Comments   Colorectal Cancer Screening  · Colonoscopy    · Fecal Occult Blood Test (FOBT)/Fecal Immunochemical Test (FIT)  · Fecal DNA/Cologuard Test  · Flexible Sigmoidoscopy Age: 39-70 years old   Colonoscopy: every 10 years (May be performed more frequently if at higher risk)  OR  FOBT/FIT: every 1 year  OR  Cologuard: every 3 years  OR  Sigmoidoscopy: every 5 years  Screening may be recommended earlier than age 39 if at higher risk for colorectal cancer  Also, an individualized decision between you and your healthcare provider will decide whether screening between the ages of 74-80 would be appropriate   Colonoscopy: 12/24/2011  FOBT/FIT: Not on file  Cologuard: Not on file  Sigmoidoscopy: Not on file          Prostate Cancer Screening Individualized decision between patient and health care provider in men between ages of 53-78   Medicare will cover every 12 months beginning on the day after your 50th birthday PSA: 1 9 ng/mL     Screening Current     Hepatitis C Screening Once for adults born between 1945 and 1965  More frequently in patients at high risk for Hepatitis C Hep C Antibody: 02/05/2019    Screening Current   Diabetes Screening 1-2 times per year if you're at risk for diabetes or have pre-diabetes Fasting glucose: 90 mg/dL (1/18/2022)  A1C: No results in last 5 years (No results in last 5 years)  Screening Current Cholesterol Screening Once every 5 years if you don't have a lipid disorder  May order more often based on risk factors  Lipid panel: 01/18/2022  Screening Not Indicated  History Lipid Disorder      Other Preventive Screenings Covered by Medicare:  1  Abdominal Aortic Aneurysm (AAA) Screening: covered once if your at risk  You're considered to be at risk if you have a family history of AAA or a male between the age of 73-68 who smoking at least 100 cigarettes in your lifetime  2  Lung Cancer Screening: covers low dose CT scan once per year if you meet all of the following conditions: (1) Age 50-69; (2) No signs or symptoms of lung cancer; (3) Current smoker or have quit smoking within the last 15 years; (4) You have a tobacco smoking history of at least 20 pack years (packs per day x number of years you smoked); (5) You get a written order from a healthcare provider  3  Glaucoma Screening: covered annually if you're considered high risk: (1) You have diabetes OR (2) Family history of glaucoma OR (3)  aged 48 and older OR (3)  American aged 72 and older  3  Osteoporosis Screening: covered every 2 years if you meet one of the following conditions: (1) Have a vertebral abnormality; (2) On glucocorticoid therapy for more than 3 months; (3) Have primary hyperparathyroidism; (4) On osteoporosis medications and need to assess response to drug therapy  5  HIV Screening: covered annually if you're between the age of 12-76  Also covered annually if you are younger than 13 and older than 72 with risk factors for HIV infection  For pregnant patients, it is covered up to 3 times per pregnancy      Immunizations:  Immunization Recommendations   Influenza Vaccine Annual influenza vaccination during flu season is recommended for all persons aged >= 6 months who do not have contraindications   Pneumococcal Vaccine   * Pneumococcal conjugate vaccine = PCV13 (Prevnar 13), PCV15 (Vaxneuvance), PCV20 (Prevnar 20)  * Pneumococcal polysaccharide vaccine = PPSV23 (Pneumovax) Adults 2364 years old: 1-3 doses may be recommended based on certain risk factors  Adults 72 years old: 1-2 doses may be recommended based off what pneumonia vaccine you previously received   Hepatitis B Vaccine 3 dose series if at intermediate or high risk (ex: diabetes, end stage renal disease, liver disease)   Tetanus (Td) Vaccine - COST NOT COVERED BY MEDICARE PART B Following completion of primary series, a booster dose should be given every 10 years to maintain immunity against tetanus  Td may also be given as tetanus wound prophylaxis  Tdap Vaccine - COST NOT COVERED BY MEDICARE PART B Recommended at least once for all adults  For pregnant patients, recommended with each pregnancy  Shingles Vaccine (Shingrix) - COST NOT COVERED BY MEDICARE PART B  2 shot series recommended in those aged 48 and above     Health Maintenance Due:      Topic Date Due    Colorectal Cancer Screening  12/24/2021    Hepatitis C Screening  Completed     Immunizations Due:      Topic Date Due    COVID-19 Vaccine (4 - Booster for Moderna series) 03/12/2022    Influenza Vaccine (1) 09/01/2022     Advance Directives   What are advance directives? Advance directives are legal documents that state your wishes and plans for medical care  These plans are made ahead of time in case you lose your ability to make decisions for yourself  Advance directives can apply to any medical decision, such as the treatments you want, and if you want to donate organs  What are the types of advance directives? There are many types of advance directives, and each state has rules about how to use them  You may choose a combination of any of the following:  · Living will: This is a written record of the treatment you want  You can also choose which treatments you do not want, which to limit, and which to stop at a certain time   This includes surgery, medicine, IV fluid, and tube feedings  · Durable power of  for healthcare [place] [facility]): This is a written record that states who you want to make healthcare choices for you when you are unable to make them for yourself  This person, called a proxy, is usually a family member or a friend  You may choose more than 1 proxy  · Do not resuscitate (DNR) order:  A DNR order is used in case your heart stops beating or you stop breathing  It is a request not to have certain forms of treatment, such as CPR  A DNR order may be included in other types of advance directives  · Medical directive: This covers the care that you want if you are in a coma, near death, or unable to make decisions for yourself  You can list the treatments you want for each condition  Treatment may include pain medicine, surgery, blood transfusions, dialysis, IV or tube feedings, and a ventilator (breathing machine)  · Values history: This document has questions about your views, beliefs, and how you feel and think about life  This information can help others choose the care that you would choose  Why are advance directives important? An advance directive helps you control your care  Although spoken wishes may be used, it is better to have your wishes written down  Spoken wishes can be misunderstood, or not followed  Treatments may be given even if you do not want them  An advance directive may make it easier for your family to make difficult choices about your care  Weight Management   Why it is important to manage your weight:  Being overweight increases your risk of health conditions such as heart disease, high blood pressure, type 2 diabetes, and certain types of cancer  It can also increase your risk for osteoarthritis, sleep apnea, and other respiratory problems  Aim for a slow, steady weight loss  Even a small amount of weight loss can lower your risk of health problems    How to lose weight safely:  A safe and healthy way to lose weight is to eat fewer calories and get regular exercise  You can lose up about 1 pound a week by decreasing the number of calories you eat by 500 calories each day  Healthy meal plan for weight management:  A healthy meal plan includes a variety of foods, contains fewer calories, and helps you stay healthy  A healthy meal plan includes the following:  · Eat whole-grain foods more often  A healthy meal plan should contain fiber  Fiber is the part of grains, fruits, and vegetables that is not broken down by your body  Whole-grain foods are healthy and provide extra fiber in your diet  Some examples of whole-grain foods are whole-wheat breads and pastas, oatmeal, brown rice, and bulgur  · Eat a variety of vegetables every day  Include dark, leafy greens such as spinach, kale, chelo greens, and mustard greens  Eat yellow and orange vegetables such as carrots, sweet potatoes, and winter squash  · Eat a variety of fruits every day  Choose fresh or canned fruit (canned in its own juice or light syrup) instead of juice  Fruit juice has very little or no fiber  · Eat low-fat dairy foods  Drink fat-free (skim) milk or 1% milk  Eat fat-free yogurt and low-fat cottage cheese  Try low-fat cheeses such as mozzarella and other reduced-fat cheeses  · Choose meat and other protein foods that are low in fat  Choose beans or other legumes such as split peas or lentils  Choose fish, skinless poultry (chicken or turkey), or lean cuts of red meat (beef or pork)  Before you cook meat or poultry, cut off any visible fat  · Use less fat and oil  Try baking foods instead of frying them  Add less fat, such as margarine, sour cream, regular salad dressing and mayonnaise to foods  Eat fewer high-fat foods  Some examples of high-fat foods include french fries, doughnuts, ice cream, and cakes  · Eat fewer sweets  Limit foods and drinks that are high in sugar  This includes candy, cookies, regular soda, and sweetened drinks    Exercise:  Exercise at least 30 minutes per day on most days of the week  Some examples of exercise include walking, biking, dancing, and swimming  You can also fit in more physical activity by taking the stairs instead of the elevator or parking farther away from stores  Ask your healthcare provider about the best exercise plan for you  © Copyright [employer] 2018 Information is for End User's use only and may not be sold, redistributed or otherwise used for commercial purposes   All illustrations and images included in CareNotes® are the copyrighted property of [employer]  or [address] [place] [address]

## 2022-09-19 NOTE — PROGRESS NOTES
Assessment and Plan:     Problem List Items Addressed This Visit        Respiratory    Mild intermittent asthma without complication    Relevant Medications    montelukast (SINGULAIR) 10 mg tablet    Other Relevant Orders    Complete PFT with post bronchodilator       Cardiovascular and Mediastinum    Essential hypertension    Relevant Medications    amLODIPine (NORVASC) 5 mg tablet    Other Relevant Orders    POCT ECG (Completed)    Coronary artery disease involving native coronary artery of native heart without angina pectoris    Relevant Medications    amLODIPine (NORVASC) 5 mg tablet       Genitourinary    Stage 3a chronic kidney disease ([address] )       Other    Mixed hyperlipidemia    Relevant Orders    Lipid Panel with Direct LDL reflex    CK    LAFLEUR (dyspnea on exertion)    Relevant Orders    D-dimer, quantitative    Complete PFT with post bronchodilator    Family history of early CAD    Medicare annual wellness visit, subsequent      Other Visit Diagnoses     Encounter for colorectal cancer screening    -  Primary    Relevant Orders    Cologuard    Seasonal allergic rhinitis due to pollen        Relevant Medications    ibuprofen (MOTRIN) 800 mg tablet    montelukast (SINGULAIR) 10 mg tablet    Other fatigue        Relevant Orders    Vitamin D 25 hydroxy    TSH, 3rd generation with Free T4 reflex    Comprehensive metabolic panel    CBC and differential    D-dimer, quantitative    Distended abdomen        Relevant Orders    XR abdomen 1 view kub    Disorder of cartilage, unspecified         Relevant Orders    Vitamin D 25 hydroxy        BMI Counseling: Body mass index is 27 43 kg/m²  The BMI is above normal  Nutrition recommendations include moderation in carbohydrate intake  Exercise recommendations include exercising 3-5 times per week  No pharmacotherapy was ordered  Rationale for BMI follow-up plan is due to patient being overweight or obese         Preventive health issues were discussed with patient, and age appropriate screening tests were ordered as noted in patient's After Visit Summary  Personalized health advice and appropriate referrals for health education or preventive services given if needed, as noted in patient's After Visit Summary  History of Present Illness:     Patient presents for a Medicare Wellness Visit    HPI   Patient Care Team:  David Hamlin DO as PCP - General  NITA Machado     Review of Systems:     Review of Systems     Problem List:     Patient Active Problem List   Diagnosis    Screening for metabolic disorder    Encounter for hepatitis C screening test for low risk patient    Mild intermittent asthma without complication    Seasonal allergies    Mixed hyperlipidemia    LAFLEUR (dyspnea on exertion)    Family history of early CAD    Atypical pigmented skin lesion    Seborrheic keratoses    Status post insertion of drug eluting coronary artery stent    Essential hypertension    Coronary artery disease involving native coronary artery of native heart without angina pectoris    Stage 3a chronic kidney disease ([facility] [address] )    Medicare annual wellness visit, subsequent      Past Medical and Surgical History:     Past Medical History:   Diagnosis Date    Acute asthmatic bronchitis     Acute frontal sinusitis     Allergic     Asthma     History of acute pharyngitis     History of allergy     History of osteopenia     History of right inguinal hernia     History of seborrheic keratosis     History of umbilical hernia     Preop examination      Past Surgical History:   Procedure Laterality Date    APPENDECTOMY      CARDIAC CATHETERIZATION Left 11/30/2021    Procedure: Cardiac Left Heart Cath;  Surgeon: Ulysses Shaggy, MD;  [facility]; Service: Cardiology    CARDIAC CATHETERIZATION N/A 11/30/2021    Procedure: Cardiac pci;  Surgeon: Ulysses Shaggy, MD;  [facility];   Service: Cardiology    INGUINAL HERNIA REPAIR      UMBILICAL HERNIA REPAIR Family History:     Family History   Problem Relation Age of Onset    Cancer Father         Adenoid Cystic Carcinoma    Heart disease Family     Multiple myeloma Mother     Heart disease Maternal Grandfather       Social History:     Social History     Socioeconomic History    Marital status: /Civil Union     Spouse name: None    Number of children: None    Years of education: None    Highest education level: None   Occupational History    None   Tobacco Use    Smoking status: Former Smoker     Packs/day: 0 00     Years: 0 00     Pack years: 0 00     Quit date:      Years since quittin 7    Smokeless tobacco: Never Used   Vaping Use    Vaping Use: Never used   Substance and Sexual Activity    Alcohol use: Yes     Alcohol/week: 0 0 standard drinks     Comment: rare    Drug use: No    Sexual activity: Yes   Other Topics Concern    None   Social History Narrative    None     Social Determinants of Health     Financial Resource Strain: Not on file   Food Insecurity: Not on file   Transportation Needs: Not on file   Physical Activity: Not on file   Stress: Not on file   Social Connections: Not on file   Intimate Partner Violence: Not on file   Housing Stability: Not on file      Medications and Allergies:     Current Outpatient Medications   Medication Sig Dispense Refill    albuterol (Ventolin HFA) 90 mcg/act inhaler Inhale 2 puffs every 6 (six) hours as needed for wheezing or shortness of breath 18 g 3    amLODIPine (NORVASC) 5 mg tablet Take 1 tablet (5 mg total) by mouth daily 30 tablet 5    amoxicillin (AMOXIL) 500 mg capsule Take 500 mg by mouth 3 (three) times a day      aspirin 81 mg chewable tablet Chew 1 tablet (81 mg total) daily 30 tablet 0    atorvastatin (LIPITOR) 40 mg tablet Take 1 tablet by mouth once daily 30 tablet 0    fluticasone-salmeterol (Advair Diskus) 500-50 mcg/dose inhaler Inhale 1 puff 2 (two) times a day Rinse mouth after use   60 blister 5    ibuprofen (MOTRIN) 800 mg tablet Take 800 mg by mouth every 8 (eight) hours as needed      montelukast (SINGULAIR) 10 mg tablet Take 1 tablet (10 mg total) by mouth daily at bedtime 30 tablet 5    ticagrelor (Brilinta) 90 MG Take 1 tablet (90 mg total) by mouth every 12 (twelve) hours 60 tablet 5    albuterol (2 5 mg/3 mL) 0 083 % nebulizer solution Take 3 mL (2 5 mg total) by nebulization every 6 (six) hours as needed for wheezing or shortness of breath (Patient not taking: No sig reported) 100 mL 0     No current facility-administered medications for this visit  Allergies   Allergen Reactions    Erythromycin GI Intolerance    Pollen Extract     Prednisone Anxiety     Oral tablets - depression      Immunizations:     Immunization History   Administered Date(s) Administered    COVID-19 MODERNA VACC 0 25 ML IM BOOSTER 11/12/2021    COVID-19 MODERNA VACC 0 5 ML IM 02/03/2021, 02/03/2021, 03/03/2021, 03/03/2021    COVID-19, unspecified 02/03/2021, 03/03/2021    INFLUENZA 12/17/2019, 09/28/2020    Influenza Quadrivalent, 6-35 Months IM 02/16/2016    Pneumococcal Conjugate 13-Valent 02/17/2020    Pneumococcal Polysaccharide PPV23 07/08/2021    Tdap 08/15/2018      Health Maintenance:         Topic Date Due    Colorectal Cancer Screening  12/24/2021    Hepatitis C Screening  Completed         Topic Date Due    COVID-19 Vaccine (4 - Booster for Moderna series) 03/12/2022    Influenza Vaccine (1) 09/01/2022      Medicare Screening Tests and Risk Assessments:     Eli Sheth is here for his Subsequent Wellness visit  Health Risk Assessment:   Patient rates overall health as good  Patient feels that their physical health rating is slightly worse  Patient is satisfied with their life  Eyesight was rated as same  Hearing was rated as same  Patient feels that their emotional and mental health rating is same  Patients states they are sometimes angry  Patient states they are often unusually tired/fatigued   Pain experienced in the last 7 days has been some  Patient's pain rating has been 5/10  Patient states that he has experienced no weight loss or gain in last 6 months  Fall Risk Screening: In the past year, patient has experienced: no history of falling in past year      Home Safety:  Patient does not have trouble with stairs inside or outside of their home  Patient has working smoke alarms and has working carbon monoxide detector  Home safety hazards include: none  Nutrition:   Current diet is Limited junk food  Medications:   Patient is currently taking over-the-counter supplements  OTC medications include: Krill/Fish Oil, + Vit  D  Patient is able to manage medications  Activities of Daily Living (ADLs)/Instrumental Activities of Daily Living (IADLs):   Walk and transfer into and out of bed and chair?: Yes  Dress and groom yourself?: Yes    Bathe or shower yourself?: Yes    Feed yourself? Yes  Do your laundry/housekeeping?: Yes  Manage your money, pay your bills and track your expenses?: Yes  Make your own meals?: Yes    Do your own shopping?: Yes    Previous Hospitalizations:   Any hospitalizations or ED visits within the last 12 months?: Yes    How many hospitalizations have you had in the last year?: 1-2    Hospitalization Comments: Stent Placement - 11/ 2021    Advance Care Planning:   Living will: Yes    Durable POA for healthcare:  Yes    Advanced directive: Yes    Five wishes given: No      Comments: his wife    Cognitive Screening:   Provider or family/friend/caregiver concerned regarding cognition?: No    PREVENTIVE SCREENINGS      Cardiovascular Screening:    General: Screening Not Indicated and History Lipid Disorder      Diabetes Screening:     General: Screening Current      Prostate Cancer Screening:    General: Screening Current      Abdominal Aortic Aneurysm (AAA) Screening:    Risk factors include: age between 73-67 yo and tobacco use        Lung Cancer Screening:     General: Screening Not Indicated      Hepatitis C Screening:    General: Screening Current    Screening, Brief Intervention, and Referral to Treatment (SBIRT)    Screening  Typical number of drinks in a day: 1  Typical number of drinks in a week: 5  Interpretation: Low risk drinking behavior  AUDIT-C Screenin) How often did you have a drink containing alcohol in the past year? 2 to 3 times a week  2) How many drinks did you have on a typical day when you were drinking in the past year? 1 to 2  3) How often did you have 6 or more drinks on one occasion in the past year? never    AUDIT-C Score: 3  Interpretation: Score 0-3 (male): Negative screen for alcohol misuse    Single Item Drug Screening:  How often have you used an illegal drug (including marijuana) or a prescription medication for non-medical reasons in the past year? never    Single Item Drug Screen Score: 0  Interpretation: Negative screen for possible drug use disorder    Brief Intervention  Alcohol & drug use screenings were reviewed  No concerns regarding substance use disorder identified  Other Counseling Topics:   Car/seat belt/driving safety, skin self-exam, sunscreen and regular weightbearing exercise and calcium and vitamin D intake       No exam data present     Physical Exam:     /90 (BP Location: Left arm, Patient Position: Sitting, Cuff Size: Standard)   Pulse 85   Temp 98 9 °F (37 2 °C) (Temporal)   Ht 5' 7 05" (1 703 m)   Wt 79 6 kg (175 lb 6 4 oz)   SpO2 97%   BMI 27 43 kg/m²     Physical Exam     Shila Mazariegos DO

## 2022-09-19 NOTE — PROGRESS NOTES
Answers for HPI/ROS submitted by the patient on 9/13/2022  How would you rate your overall health?: good  Compared to last year, how is your physical health?: slightly worse  In general, how satisfied are you with your life?: satisfied  Compared to last year, how is your eyesight?: same  Compared to last year, how is your hearing?: same  Compared to last year, how is your emotional/mental health?: same  How often is anger a problem for you?: sometimes  How often do you feel unusually tired/fatigued?: often  In the past 7 days, how much pain have you experienced?: some  If you answered "some" or "a lot", please rate the severity of your pain on a scale of 1 to 10 (1 being the least severe pain and 10 being the most intense pain)  : 5/10  In the past 6 months, have you lost or gained 10 pounds without trying?: No  One or more falls in the last year: No  Do you have trouble with the stairs inside or outside your home?: No  Does your home have working smoke alarms?: Yes  Does your home have a carbon monoxide monitor?: Yes  Which safety hazards (if any) have you experienced in your home? Please select all that apply : none  How would you describe your current diet? Please select all that apply : Limited junk food  In addition to prescription medications, are you taking any over-the-counter supplements?: Yes  If yes, what supplements are you taking?: Krill/Fish Oil, + Vit   D  Can you manage your medications?: Yes  Are you currently taking any opioid medications?: No  Can you walk and transfer into and out of your bed and chair?: Yes  Can you dress and groom yourself?: Yes  Can you bathe or shower yourself?: Yes  Can you feed yourself?: Yes  Can you do your laundry/ housekeeping?: Yes  Can you manage your money, pay your bills, and track your expenses?: Yes  Can you make your own meals?: Yes  Can you do your own shopping?: Yes  Within the last 12 months, have you had any hospitalizations or Emergency Department visits?: Yes  If yes, how many times have you been hospitalized within the past year?: 1-2  Additional Comments: Stent Placement - 11/ 2021  Do you have a living will?: Yes  Do you have a Durable POA (Power of ) for healthcare decisions?: Yes  Do you have an Advanced Directive for end of life decisions?: Yes  How often have you used an illegal drug (including marijuana) or a prescription medication for non-medical reasons in the past year?: never  What is the typical number of drinks you consume in a day?: 1  What is the typical number of drinks you consume in a week?: 5  How often did you have a drink containing alcohol in the past year?: 2 to 3 times a week  How many drinks did you have on a typical day  when you were drinking in the past year?: 1 to 2  How often did you have 6 or more drinks on one occasion in the past year?: never    Assessment/Plan:    1  Encounter for colorectal cancer screening  -     Cologuard    2  Medicare annual wellness visit, subsequent    3  Stage 3a chronic kidney disease ([facility] [address] )    4  Mixed hyperlipidemia  -     Lipid Panel with Direct LDL reflex; Future  -     CK; Future    5  Mild intermittent asthma without complication  -     Complete PFT with post bronchodilator; Future    6  Coronary artery disease involving native coronary artery of native heart without angina pectoris    7  LAFLEUR (dyspnea on exertion)  -     D-dimer, quantitative; Future  -     POCT ECG  -     Complete PFT with post bronchodilator; Future    8  Essential hypertension  -     amLODIPine (NORVASC) 5 mg tablet; Take 1 tablet (5 mg total) by mouth daily    9  Family history of early CAD    8  Seasonal allergic rhinitis due to pollen  -     montelukast (SINGULAIR) 10 mg tablet; Take 1 tablet (10 mg total) by mouth daily at bedtime    11  Other fatigue  -     Vitamin D 25 hydroxy; Future  -     TSH, 3rd generation with Free T4 reflex; Future  -     Comprehensive metabolic panel;  Future  -     CBC and differential; Future  -     D-dimer, quantitative; Future    12  Distended abdomen  -     XR abdomen 1 view kub; Future; Expected date: 09/19/2022    13  Disorder of cartilage, unspecified   -     Vitamin D 25 hydroxy; Future        EKG: normal EKG, normal sinus rhythm, unchanged from previous tracings    Exam negative today  Repeat PFTs  Recommend short course of steroids but patient refused and states that he gets severe depression with oral prednisone  He is compliant with his inhalers  I did notify his cardiologist to schedule follow-up for him  Last CT scan of the chest and chest x-ray were normal   Check lab work ASAP with D-dimer to rule out pulmonary embolism although I doubt that this is the issue  Check abdominal x-ray, start allergy medications  May need GI referral   Patient agreeable to this plan  Stop lisinopril start amlodipine  Follow-up in our office in 3-4 weeks for blood pressure and dyspnea on exertion  Patient has a form that is dentist would like filled out to stop Brilinta 5 days prior to dental procedure  I will defer this to his cardiologist           Patient Instructions       Medicare Preventive Visit Patient Instructions  Thank you for completing your Welcome to Medicare Visit or Medicare Annual Wellness Visit today  Your next wellness visit will be due in one year (9/20/2023)  The screening/preventive services that you may require over the next 5-10 years are detailed below  Some tests may not apply to you based off risk factors and/or age  Screening tests ordered at today's visit but not completed yet may show as past due  Also, please note that scanned in results may not display below    Preventive Screenings:  Service Recommendations Previous Testing/Comments   Colorectal Cancer Screening  · Colonoscopy    · Fecal Occult Blood Test (FOBT)/Fecal Immunochemical Test (FIT)  · Fecal DNA/Cologuard Test  · Flexible Sigmoidoscopy Age: 39-70 years old   Colonoscopy: every 10 years (May be performed more frequently if at higher risk)  OR  FOBT/FIT: every 1 year  OR  Cologuard: every 3 years  OR  Sigmoidoscopy: every 5 years  Screening may be recommended earlier than age 39 if at higher risk for colorectal cancer  Also, an individualized decision between you and your healthcare provider will decide whether screening between the ages of 74-80 would be appropriate  Colonoscopy: 12/24/2011  FOBT/FIT: Not on file  Cologuard: Not on file  Sigmoidoscopy: Not on file          Prostate Cancer Screening Individualized decision between patient and health care provider in men between ages of 53-78   Medicare will cover every 12 months beginning on the day after your 50th birthday PSA: 1 9 ng/mL     Screening Current     Hepatitis C Screening Once for adults born between 1945 and 1965  More frequently in patients at high risk for Hepatitis C Hep C Antibody: 02/05/2019    Screening Current   Diabetes Screening 1-2 times per year if you're at risk for diabetes or have pre-diabetes Fasting glucose: 90 mg/dL (1/18/2022)  A1C: No results in last 5 years (No results in last 5 years)  Screening Current   Cholesterol Screening Once every 5 years if you don't have a lipid disorder  May order more often based on risk factors  Lipid panel: 01/18/2022  Screening Not Indicated  History Lipid Disorder      Other Preventive Screenings Covered by Medicare:  1  Abdominal Aortic Aneurysm (AAA) Screening: covered once if your at risk  You're considered to be at risk if you have a family history of AAA or a male between the age of 73-68 who smoking at least 100 cigarettes in your lifetime    2  Lung Cancer Screening: covers low dose CT scan once per year if you meet all of the following conditions: (1) Age 50-69; (2) No signs or symptoms of lung cancer; (3) Current smoker or have quit smoking within the last 15 years; (4) You have a tobacco smoking history of at least 20 pack years (packs per day x number of years you smoked); (5) You get a written order from a healthcare provider  3  Glaucoma Screening: covered annually if you're considered high risk: (1) You have diabetes OR (2) Family history of glaucoma OR (3)  aged 48 and older OR (3)  American aged 72 and older  3  Osteoporosis Screening: covered every 2 years if you meet one of the following conditions: (1) Have a vertebral abnormality; (2) On glucocorticoid therapy for more than 3 months; (3) Have primary hyperparathyroidism; (4) On osteoporosis medications and need to assess response to drug therapy  5  HIV Screening: covered annually if you're between the age of 12-76  Also covered annually if you are younger than 13 and older than 72 with risk factors for HIV infection  For pregnant patients, it is covered up to 3 times per pregnancy  Immunizations:  Immunization Recommendations   Influenza Vaccine Annual influenza vaccination during flu season is recommended for all persons aged >= 6 months who do not have contraindications   Pneumococcal Vaccine   * Pneumococcal conjugate vaccine = PCV13 (Prevnar 13), PCV15 (Vaxneuvance), PCV20 (Prevnar 20)  * Pneumococcal polysaccharide vaccine = PPSV23 (Pneumovax) Adults 25-60 years old: 1-3 doses may be recommended based on certain risk factors  Adults 72 years old: 1-2 doses may be recommended based off what pneumonia vaccine you previously received   Hepatitis B Vaccine 3 dose series if at intermediate or high risk (ex: diabetes, end stage renal disease, liver disease)   Tetanus (Td) Vaccine - COST NOT COVERED BY MEDICARE PART B Following completion of primary series, a booster dose should be given every 10 years to maintain immunity against tetanus  Td may also be given as tetanus wound prophylaxis  Tdap Vaccine - COST NOT COVERED BY MEDICARE PART B Recommended at least once for all adults  For pregnant patients, recommended with each pregnancy     Shingles Vaccine (Shingrix) - COST NOT COVERED BY MEDICARE PART B 2 shot series recommended in those aged 48 and above     Health Maintenance Due:      Topic Date Due    Colorectal Cancer Screening  12/24/2021    Hepatitis C Screening  Completed     Immunizations Due:      Topic Date Due    COVID-19 Vaccine (4 - Booster for Moderna series) 03/12/2022    Influenza Vaccine (1) 09/01/2022     Advance Directives   What are advance directives? Advance directives are legal documents that state your wishes and plans for medical care  These plans are made ahead of time in case you lose your ability to make decisions for yourself  Advance directives can apply to any medical decision, such as the treatments you want, and if you want to donate organs  What are the types of advance directives? There are many types of advance directives, and each state has rules about how to use them  You may choose a combination of any of the following:  · Living will: This is a written record of the treatment you want  You can also choose which treatments you do not want, which to limit, and which to stop at a certain time  This includes surgery, medicine, IV fluid, and tube feedings  · Durable power of  for healthcare [facility]): This is a written record that states who you want to make healthcare choices for you when you are unable to make them for yourself  This person, called a proxy, is usually a family member or a friend  You may choose more than 1 proxy  · Do not resuscitate (DNR) order:  A DNR order is used in case your heart stops beating or you stop breathing  It is a request not to have certain forms of treatment, such as CPR  A DNR order may be included in other types of advance directives  · Medical directive: This covers the care that you want if you are in a coma, near death, or unable to make decisions for yourself  You can list the treatments you want for each condition   Treatment may include pain medicine, surgery, blood transfusions, dialysis, IV or tube feedings, and a ventilator (breathing machine)  · Values history: This document has questions about your views, beliefs, and how you feel and think about life  This information can help others choose the care that you would choose  Why are advance directives important? An advance directive helps you control your care  Although spoken wishes may be used, it is better to have your wishes written down  Spoken wishes can be misunderstood, or not followed  Treatments may be given even if you do not want them  An advance directive may make it easier for your family to make difficult choices about your care  Weight Management   Why it is important to manage your weight:  Being overweight increases your risk of health conditions such as heart disease, high blood pressure, type 2 diabetes, and certain types of cancer  It can also increase your risk for osteoarthritis, sleep apnea, and other respiratory problems  Aim for a slow, steady weight loss  Even a small amount of weight loss can lower your risk of health problems  How to lose weight safely:  A safe and healthy way to lose weight is to eat fewer calories and get regular exercise  You can lose up about 1 pound a week by decreasing the number of calories you eat by 500 calories each day  Healthy meal plan for weight management:  A healthy meal plan includes a variety of foods, contains fewer calories, and helps you stay healthy  A healthy meal plan includes the following:  · Eat whole-grain foods more often  A healthy meal plan should contain fiber  Fiber is the part of grains, fruits, and vegetables that is not broken down by your body  Whole-grain foods are healthy and provide extra fiber in your diet  Some examples of whole-grain foods are whole-wheat breads and pastas, oatmeal, brown rice, and bulgur  · Eat a variety of vegetables every day  Include dark, leafy greens such as spinach, kale, chelo greens, and mustard greens   Eat yellow and orange vegetables such as carrots, sweet potatoes, and winter squash  · Eat a variety of fruits every day  Choose fresh or canned fruit (canned in its own juice or light syrup) instead of juice  Fruit juice has very little or no fiber  · Eat low-fat dairy foods  Drink fat-free (skim) milk or 1% milk  Eat fat-free yogurt and low-fat cottage cheese  Try low-fat cheeses such as mozzarella and other reduced-fat cheeses  · Choose meat and other protein foods that are low in fat  Choose beans or other legumes such as split peas or lentils  Choose fish, skinless poultry (chicken or turkey), or lean cuts of red meat (beef or pork)  Before you cook meat or poultry, cut off any visible fat  · Use less fat and oil  Try baking foods instead of frying them  Add less fat, such as margarine, sour cream, regular salad dressing and mayonnaise to foods  Eat fewer high-fat foods  Some examples of high-fat foods include french fries, doughnuts, ice cream, and cakes  · Eat fewer sweets  Limit foods and drinks that are high in sugar  This includes candy, cookies, regular soda, and sweetened drinks  Exercise:  Exercise at least 30 minutes per day on most days of the week  Some examples of exercise include walking, biking, dancing, and swimming  You can also fit in more physical activity by taking the stairs instead of the elevator or parking farther away from stores  Ask your healthcare provider about the best exercise plan for you  © Copyright [employer] 2018 Information is for End User's use only and may not be sold, redistributed or otherwise used for commercial purposes  All illustrations and images included in CareNotes® are the copyrighted property of [employer]  or [employer]      Return in about 4 weeks (around 10/17/2022) for Recheck  Subjective:      Patient ID: Kajal Gates is a 78 y o  male      Chief Complaint   Patient presents with    Medicare Wellness Visit     Sub awv    Follow-up     6 month follow up pt bp has been running high at home   [employer] [employer] ordered       HPI     Asthma, intermittent, has some wheezing more so from his throat on a regular basis  Gregory Rehman uses Advair once a day even though he knows he is supposed to do it twice a day   Not on antihistamine or mast cell stabilizer   Uses albuterol inhaler every single morning once a day  [facility] is very energetic and works long hours in [employer] and also goes to [place] and walks as exercise   He does not have baseline shortness of breath  July 2021, had PFTs done in January which showed FEV1 of 56%   At 1 point he has severe asthma and was on Advair 500 mg and currently is on 250  His FEV1 to use to be 46% according to him   His last PFT was done many many years ago   No recent exacerbations requiring ER visits hospitalizations or prednisone  Sept 2021: stable, no exacerbations, has had LAFLEUR with heavy lifting in the yard  September 2022, has been having dyspnea on exertion and decreased endurance this year  Was seen in December for similar symptoms and workup was negative  Since that time patient states he has not gotten any better and he cannot go up 1-2 flights of steps without stopping to catch his breath which is completely abnormal for him  He had a drug-eluting stent placed but did not have his summer follow-up due to not being able to contact the office  He feels his difficulties are from lisinopril and Brilinta and he would like both changed  Patient states he did stop them for 2 days until better  He states his abdomen has been getting larger without any change in weight  He denies constipation nausea vomiting or bloating  He is not having any wheezing at home and at rest he has no shortness of breath    He has not needed his rescue inhaler but and he is compliant with his medications         Hyperlipidemia, currently not on medications   His cholesterol does fluctuate from time to time  [facility] does have family history but he eats very well and needs approximately 5-6 very small meals throughout the day on a regular basis  July 2021, last LDL remains elevated   Patient not on statin and did not get his stress test as recommended  [facility] was concerned with the dobutamine and is asthma  [facility] is requesting alternative testing  [facility] has significant early family history in his father who had 3 acute MI eyes and then underwent chelation therapy which was beneficial to him  Judith Lemon occasionally has intermittent chest discomfort but nothing that lasts or makes him worried to go to the emergency room  [facility] is very active and works and is yard   Sept 2021: had labs done, improved lipids, not on meds, Ct calcium score Is resulted he is here for results  September 2022, last lab work done in December showed normal levels  Due for lab work now  Drug-eluting stent placed less than 1 year ago  Was on Plavix it was making him tired with metoprolol and both were changed to Brilinta and lisinopril  He feels that he is not any better and his dyspnea on exertion and fatigue and lack of endurance or due to these medications  He did not have a 6 month follow-up scheduled by Cardiology and now it is at 9 months  Essential hypertension, has been elevated at home with all of his home readings over the past several weeks  He has been taking his lisinopril 40 mg daily as prescribed but he states it is not working any longer would like that changed      The following portions of the patient's history were reviewed and updated as appropriate: allergies, current medications, past family history, past medical history, past social history, past surgical history and problem list     Review of Systems      Constitutional:  Denies fever or chills   Eyes:  Denies double , blurry vision or eye pain  HENT:  Denies nasal congestion or sore throat   Respiratory:  Denies cough or shortness of breath or wheezing  Cardiovascular:  Denies palpitations or chest pain  GI: Denies abdominal pain, nausea, or vomiting, no loose stools, no reflux  Integument:  Denies rash , no open areas  Neurologic:  Denies headache or focal weakness, no dizziness  : no dysuria, or hematuria      Current Outpatient Medications   Medication Sig Dispense Refill    albuterol (Ventolin HFA) 90 mcg/act inhaler Inhale 2 puffs every 6 (six) hours as needed for wheezing or shortness of breath 18 g 3    amLODIPine (NORVASC) 5 mg tablet Take 1 tablet (5 mg total) by mouth daily 30 tablet 5    amoxicillin (AMOXIL) 500 mg capsule Take 500 mg by mouth 3 (three) times a day      aspirin 81 mg chewable tablet Chew 1 tablet (81 mg total) daily 30 tablet 0    atorvastatin (LIPITOR) 40 mg tablet Take 1 tablet by mouth once daily 30 tablet 0    fluticasone-salmeterol (Advair Diskus) 500-50 mcg/dose inhaler Inhale 1 puff 2 (two) times a day Rinse mouth after use  60 blister 5    ibuprofen (MOTRIN) 800 mg tablet Take 800 mg by mouth every 8 (eight) hours as needed      montelukast (SINGULAIR) 10 mg tablet Take 1 tablet (10 mg total) by mouth daily at bedtime 30 tablet 5    ticagrelor (Brilinta) 90 MG Take 1 tablet (90 mg total) by mouth every 12 (twelve) hours 60 tablet 5    albuterol (2 5 mg/3 mL) 0 083 % nebulizer solution Take 3 mL (2 5 mg total) by nebulization every 6 (six) hours as needed for wheezing or shortness of breath (Patient not taking: No sig reported) 100 mL 0     No current facility-administered medications for this visit         Objective:    /90 (BP Location: Left arm, Patient Position: Sitting, Cuff Size: Standard)   Pulse 85   Temp 98 9 °F (37 2 °C) (Temporal)   Ht 5' 7 05" (1 703 m)   Wt 79 6 kg (175 lb 6 4 oz)   SpO2 97%   BMI 27 43 kg/m²        Physical Exam       Constitutional:  Well developed, well nourished, no acute distress, non-toxic appearance   Eyes:  PERRL, conjunctiva normal , non icteric sclera  HENT:  Atraumatic, oropharynx moist  Neck-  supple   Respiratory:  CTA b/l, normal breath sounds, no rales, no wheezing   Cardiovascular:  RRR, no murmurs, no LE edema b/l  GI:  Soft, nondistended, normal bowel sounds x 4, nontender, no organomegaly, no mass, no rebound, no guarding   Neurologic:  no focal deficits noted   Psychiatric:  Speech and behavior appropriate , AAO x 3        Kin Schulz, DO

## 2022-09-28 ENCOUNTER — HOSPITAL ENCOUNTER (OUTPATIENT)
Dept: PULMONOLOGY | Facility: HOSPITAL | Age: 67
Discharge: HOME | End: 2022-09-28
Attending: FAMILY MEDICINE
Payer: MEDICARE

## 2022-09-28 ENCOUNTER — HOSPITAL ENCOUNTER (OUTPATIENT)
Dept: RADIOLOGY | Facility: HOSPITAL | Age: 67
Discharge: HOME | End: 2022-09-28
Attending: FAMILY MEDICINE
Payer: MEDICARE

## 2022-09-28 DIAGNOSIS — J45.20 MILD INTERMITTENT ASTHMA WITHOUT COMPLICATION: ICD-10-CM

## 2022-09-28 DIAGNOSIS — R14.0 DISTENDED ABDOMEN: ICD-10-CM

## 2022-09-28 DIAGNOSIS — R06.09 DOE (DYSPNEA ON EXERTION): ICD-10-CM

## 2022-09-28 PROCEDURE — 94760 N-INVAS EAR/PLS OXIMETRY 1: CPT

## 2022-09-28 PROCEDURE — 94060 EVALUATION OF WHEEZING: CPT

## 2022-09-28 PROCEDURE — 94729 DIFFUSING CAPACITY: CPT

## 2022-09-28 PROCEDURE — 94726 PLETHYSMOGRAPHY LUNG VOLUMES: CPT

## 2022-09-28 PROCEDURE — 74018 RADEX ABDOMEN 1 VIEW: CPT

## 2022-09-28 PROCEDURE — 94060 EVALUATION OF WHEEZING: CPT | Performed by: INTERNAL MEDICINE

## 2022-09-28 PROCEDURE — 94729 DIFFUSING CAPACITY: CPT | Performed by: INTERNAL MEDICINE

## 2022-09-28 PROCEDURE — 94726 PLETHYSMOGRAPHY LUNG VOLUMES: CPT | Performed by: INTERNAL MEDICINE

## 2022-09-28 RX ORDER — ALBUTEROL SULFATE 2.5 MG/3ML
2.5 SOLUTION RESPIRATORY (INHALATION) ONCE
Status: COMPLETED | OUTPATIENT
Start: 2022-09-28 | End: 2022-09-28

## 2022-09-28 RX ADMIN — ALBUTEROL SULFATE 2.5 MG: 2.5 SOLUTION RESPIRATORY (INHALATION) at 13:45

## 2022-10-06 DIAGNOSIS — Z82.49 FAMILY HISTORY OF EARLY CAD: ICD-10-CM

## 2022-10-06 DIAGNOSIS — E78.2 MIXED HYPERLIPIDEMIA: ICD-10-CM

## 2022-10-07 ENCOUNTER — TELEPHONE (OUTPATIENT)
Dept: INTERNAL MEDICINE CLINIC | Facility: CLINIC | Age: 67
End: 2022-10-07

## 2022-10-07 RX ORDER — ATORVASTATIN CALCIUM 40 MG/1
TABLET, FILM COATED ORAL
Qty: 30 TABLET | Refills: 3 | Status: SHIPPED | OUTPATIENT
Start: 2022-10-07

## 2022-10-07 NOTE — TELEPHONE ENCOUNTER
----- Message from Gissel Ceron sent at 10/7/2022  8:20 AM EDT -----  Regarding: Lipitor  Good morning, I received a message today saying that a prescription for atorvastatin (LIPITOR) 40 mg tablet   has been approved  I do not need refills as i no longer take this medication  Thanks, have a nice day!

## 2022-10-12 ENCOUNTER — APPOINTMENT (OUTPATIENT)
Dept: LAB | Age: 67
End: 2022-10-12
Payer: MEDICARE

## 2022-10-12 DIAGNOSIS — E78.2 MIXED HYPERLIPIDEMIA: ICD-10-CM

## 2022-10-12 DIAGNOSIS — M94.9 DISORDER OF CARTILAGE, UNSPECIFIED: ICD-10-CM

## 2022-10-12 DIAGNOSIS — R53.83 OTHER FATIGUE: ICD-10-CM

## 2022-10-12 DIAGNOSIS — R06.09 DOE (DYSPNEA ON EXERTION): ICD-10-CM

## 2022-10-12 LAB
25(OH)D3 SERPL-MCNC: 81.6 NG/ML (ref 30–100)
ALBUMIN SERPL BCP-MCNC: 3.7 G/DL (ref 3.5–5)
ALP SERPL-CCNC: 50 U/L (ref 46–116)
ALT SERPL W P-5'-P-CCNC: 48 U/L (ref 12–78)
ANION GAP SERPL CALCULATED.3IONS-SCNC: 4 MMOL/L (ref 4–13)
AST SERPL W P-5'-P-CCNC: 29 U/L (ref 5–45)
BASOPHILS # BLD AUTO: 0.07 THOUSANDS/ΜL (ref 0–0.1)
BASOPHILS NFR BLD AUTO: 1 % (ref 0–1)
BILIRUB SERPL-MCNC: 0.57 MG/DL (ref 0.2–1)
BUN SERPL-MCNC: 19 MG/DL (ref 5–25)
CALCIUM SERPL-MCNC: 8.7 MG/DL (ref 8.3–10.1)
CHLORIDE SERPL-SCNC: 104 MMOL/L (ref 96–108)
CHOLEST SERPL-MCNC: 148 MG/DL
CK MB SERPL-MCNC: 1.6 % (ref 0–2.5)
CK MB SERPL-MCNC: 3.5 NG/ML (ref 0–5)
CK SERPL-CCNC: 218 U/L (ref 39–308)
CO2 SERPL-SCNC: 28 MMOL/L (ref 21–32)
CREAT SERPL-MCNC: 1.08 MG/DL (ref 0.6–1.3)
D DIMER PPP FEU-MCNC: <0.27 UG/ML FEU
EOSINOPHIL # BLD AUTO: 0.24 THOUSAND/ΜL (ref 0–0.61)
EOSINOPHIL NFR BLD AUTO: 4 % (ref 0–6)
ERYTHROCYTE [DISTWIDTH] IN BLOOD BY AUTOMATED COUNT: 12.5 % (ref 11.6–15.1)
GFR SERPL CREATININE-BSD FRML MDRD: 70 ML/MIN/1.73SQ M
GLUCOSE P FAST SERPL-MCNC: 127 MG/DL (ref 65–99)
HCT VFR BLD AUTO: 48.9 % (ref 36.5–49.3)
HDLC SERPL-MCNC: 52 MG/DL
HGB BLD-MCNC: 15.4 G/DL (ref 12–17)
IMM GRANULOCYTES # BLD AUTO: 0.01 THOUSAND/UL (ref 0–0.2)
IMM GRANULOCYTES NFR BLD AUTO: 0 % (ref 0–2)
LDLC SERPL CALC-MCNC: 61 MG/DL (ref 0–100)
LYMPHOCYTES # BLD AUTO: 1.79 THOUSANDS/ΜL (ref 0.6–4.47)
LYMPHOCYTES NFR BLD AUTO: 30 % (ref 14–44)
MCH RBC QN AUTO: 30.3 PG (ref 26.8–34.3)
MCHC RBC AUTO-ENTMCNC: 31.5 G/DL (ref 31.4–37.4)
MCV RBC AUTO: 96 FL (ref 82–98)
MONOCYTES # BLD AUTO: 0.53 THOUSAND/ΜL (ref 0.17–1.22)
MONOCYTES NFR BLD AUTO: 9 % (ref 4–12)
NEUTROPHILS # BLD AUTO: 3.37 THOUSANDS/ΜL (ref 1.85–7.62)
NEUTS SEG NFR BLD AUTO: 56 % (ref 43–75)
NRBC BLD AUTO-RTO: 0 /100 WBCS
PLATELET # BLD AUTO: 299 THOUSANDS/UL (ref 149–390)
PMV BLD AUTO: 9.4 FL (ref 8.9–12.7)
POTASSIUM SERPL-SCNC: 3.9 MMOL/L (ref 3.5–5.3)
PROT SERPL-MCNC: 6.8 G/DL (ref 6.4–8.4)
RBC # BLD AUTO: 5.08 MILLION/UL (ref 3.88–5.62)
SODIUM SERPL-SCNC: 136 MMOL/L (ref 135–147)
TRIGL SERPL-MCNC: 174 MG/DL
TSH SERPL DL<=0.05 MIU/L-ACNC: 2.08 UIU/ML (ref 0.45–4.5)
WBC # BLD AUTO: 6.01 THOUSAND/UL (ref 4.31–10.16)

## 2022-10-12 PROCEDURE — 82550 ASSAY OF CK (CPK): CPT

## 2022-10-12 PROCEDURE — 82306 VITAMIN D 25 HYDROXY: CPT

## 2022-10-12 PROCEDURE — 85025 COMPLETE CBC W/AUTO DIFF WBC: CPT

## 2022-10-12 PROCEDURE — 85379 FIBRIN DEGRADATION QUANT: CPT

## 2022-10-12 PROCEDURE — 82553 CREATINE MB FRACTION: CPT

## 2022-10-12 PROCEDURE — 84443 ASSAY THYROID STIM HORMONE: CPT

## 2022-10-12 PROCEDURE — 36415 COLL VENOUS BLD VENIPUNCTURE: CPT

## 2022-10-12 PROCEDURE — 80061 LIPID PANEL: CPT

## 2022-10-12 PROCEDURE — 80053 COMPREHEN METABOLIC PANEL: CPT

## 2022-10-20 ENCOUNTER — OFFICE VISIT (OUTPATIENT)
Dept: CARDIOLOGY CLINIC | Facility: CLINIC | Age: 67
End: 2022-10-20
Payer: MEDICARE

## 2022-10-20 VITALS
OXYGEN SATURATION: 98 % | SYSTOLIC BLOOD PRESSURE: 198 MMHG | HEART RATE: 94 BPM | BODY MASS INDEX: 27.37 KG/M2 | DIASTOLIC BLOOD PRESSURE: 88 MMHG | WEIGHT: 174.4 LBS | HEIGHT: 67 IN

## 2022-10-20 DIAGNOSIS — I25.10 CORONARY ARTERY DISEASE INVOLVING NATIVE CORONARY ARTERY OF NATIVE HEART WITHOUT ANGINA PECTORIS: ICD-10-CM

## 2022-10-20 DIAGNOSIS — R06.09 DOE (DYSPNEA ON EXERTION): ICD-10-CM

## 2022-10-20 DIAGNOSIS — I10 ESSENTIAL HYPERTENSION: Primary | ICD-10-CM

## 2022-10-20 DIAGNOSIS — E78.2 MIXED HYPERLIPIDEMIA: ICD-10-CM

## 2022-10-20 DIAGNOSIS — Z95.5 STATUS POST INSERTION OF DRUG ELUTING CORONARY ARTERY STENT: ICD-10-CM

## 2022-10-20 PROCEDURE — 99214 OFFICE O/P EST MOD 30 MIN: CPT | Performed by: INTERNAL MEDICINE

## 2022-10-20 RX ORDER — AMLODIPINE BESYLATE 10 MG/1
10 TABLET ORAL DAILY
Qty: 90 TABLET | Refills: 3 | Status: SHIPPED | OUTPATIENT
Start: 2022-10-20

## 2022-10-20 NOTE — PROGRESS NOTES
Cardiology Consultation     Daniella Reyna  [id number]  1955  HEART & VASCULAR [facility] [place] [address]    1  Essential hypertension  amLODIPine (NORVASC) 10 mg tablet   2  Coronary artery disease involving native coronary artery of native heart without angina pectoris     3  Mixed hyperlipidemia     4  LAFLEUR (dyspnea on exertion)     5  Status post insertion of drug eluting coronary artery stent       Chief Complaint   Patient presents with   • Follow-up   • Shortness of Breath     On exertion       HPI: Patient feels well, without complaints other than continued mild SOB that is continued, unchanged from prior  No reported chest pain, palpitations, lightheadedness, syncope, LE edema, orthopnea, PND, or significant weight changes  Patient remains active without any increased fatigue out of the ordinary        Patient Active Problem List   Diagnosis   • Screening for metabolic disorder   • Encounter for hepatitis C screening test for low risk patient   • Mild intermittent asthma without complication   • Seasonal allergies   • Mixed hyperlipidemia   • LAFLEUR (dyspnea on exertion)   • Family history of early CAD   • Atypical pigmented skin lesion   • Seborrheic keratoses   • Status post insertion of drug eluting coronary artery stent   • Essential hypertension   • Coronary artery disease involving native coronary artery of native heart without angina pectoris   • Stage 3a chronic kidney disease ([facility] [address] )   • Medicare annual wellness visit, subsequent     Past Medical History:   Diagnosis Date   • Acute asthmatic bronchitis    • Acute frontal sinusitis    • Allergic    • Asthma    • History of acute pharyngitis    • History of allergy    • History of osteopenia    • History of right inguinal hernia    • History of seborrheic keratosis    • History of umbilical hernia    • Preop examination      Social History Socioeconomic History   • Marital status: /Civil Union     Spouse name: Not on file   • Number of children: Not on file   • Years of education: Not on file   • Highest education level: Not on file   Occupational History   • Not on file   Tobacco Use   • Smoking status: Former Smoker     Packs/day: 0 00     Years: 0 00     Pack years: 0 00     Quit date: 26     Years since quittin 8   • Smokeless tobacco: Never Used   Vaping Use   • Vaping Use: Never used   Substance and Sexual Activity   • Alcohol use: Yes     Alcohol/week: 0 0 standard drinks     Comment: rare   • Drug use: No   • Sexual activity: Yes   Other Topics Concern   • Not on file   Social History Narrative   • Not on file     Social Determinants of Health     Financial Resource Strain: Not on file   Food Insecurity: Not on file   Transportation Needs: Not on file   Physical Activity: Not on file   Stress: Not on file   Social Connections: Not on file   Intimate Partner Violence: Not on file   Housing Stability: Not on file      Family History   Problem Relation Age of Onset   • Cancer Father         Adenoid Cystic Carcinoma   • Heart disease Family    • Multiple myeloma Mother    • Heart disease Maternal Grandfather      Past Surgical History:   Procedure Laterality Date   • APPENDECTOMY     • CARDIAC CATHETERIZATION Left 2021    Procedure: Cardiac Left Heart Cath;  Surgeon: Chantale Crowder MD;  [facility]; Service: Cardiology   • CARDIAC CATHETERIZATION N/A 2021    Procedure: Cardiac pci;  Surgeon: Chantale Crowder MD;  [facility];   Service: Cardiology   • INGUINAL HERNIA REPAIR     • UMBILICAL HERNIA REPAIR         Current Outpatient Medications:   •  albuterol (Ventolin HFA) 90 mcg/act inhaler, Inhale 2 puffs every 6 (six) hours as needed for wheezing or shortness of breath, Disp: 18 g, Rfl: 3  •  amLODIPine (NORVASC) 10 mg tablet, Take 1 tablet (10 mg total) by mouth daily, Disp: 90 tablet, Rfl: 3  •  aspirin 81 mg chewable tablet, Chew 1 tablet (81 mg total) daily, Disp: 30 tablet, Rfl: 0  •  atorvastatin (LIPITOR) 40 mg tablet, Take 1 tablet by mouth once daily, Disp: 30 tablet, Rfl: 3  •  fluticasone-salmeterol (Advair Diskus) 500-50 mcg/dose inhaler, Inhale 1 puff 2 (two) times a day Rinse mouth after use , Disp: 60 blister, Rfl: 5  •  ticagrelor (Brilinta) 90 MG, Take 1 tablet (90 mg total) by mouth every 12 (twelve) hours, Disp: 60 tablet, Rfl: 5  •  albuterol (2 5 mg/3 mL) 0 083 % nebulizer solution, Take 3 mL (2 5 mg total) by nebulization every 6 (six) hours as needed for wheezing or shortness of breath (Patient not taking: No sig reported), Disp: 100 mL, Rfl: 0  •  amoxicillin (AMOXIL) 500 mg capsule, Take 500 mg by mouth 3 (three) times a day (Patient not taking: Reported on 10/20/2022), Disp: , Rfl:   •  ibuprofen (MOTRIN) 800 mg tablet, Take 800 mg by mouth every 8 (eight) hours as needed (Patient not taking: Reported on 10/20/2022), Disp: , Rfl:   •  montelukast (SINGULAIR) 10 mg tablet, Take 1 tablet (10 mg total) by mouth daily at bedtime (Patient not taking: No sig reported), Disp: 30 tablet, Rfl: 5  Allergies   Allergen Reactions   • Erythromycin GI Intolerance   • Pollen Extract    • Prednisone Anxiety     Oral tablets - depression     Vitals:    10/20/22 1505   BP: (!) 198/88   BP Location: Left arm   Patient Position: Sitting   Cuff Size: Large   Pulse: 94   SpO2: 98%   Weight: 79 1 kg (174 lb 6 4 oz)   Height: 5' 7 05" (1 703 m)       Labs:  Appointment on 10/12/2022   Component Date Value   • Vit D, 25-Hydroxy 10/12/2022 81 6    • Cholesterol 10/12/2022 148    • Triglycerides 10/12/2022 174 (A)   • HDL, Direct 10/12/2022 52    • LDL Calculated 10/12/2022 61    • TSH 3RD GENERATON 10/12/2022 2 080    • Sodium 10/12/2022 136    • Potassium 10/12/2022 3 9    • Chloride 10/12/2022 104    • CO2 10/12/2022 28    • ANION GAP 10/12/2022 4    • BUN 10/12/2022 19    • Creatinine 10/12/2022 1 08    • Glucose, Fasting 10/12/2022 127 (A)   • Calcium 10/12/2022 8 7    • AST 10/12/2022 29    • ALT 10/12/2022 48    • Alkaline Phosphatase 10/12/2022 50    • Total Protein 10/12/2022 6 8    • Albumin 10/12/2022 3 7    • Total Bilirubin 10/12/2022 0 57    • eGFR 10/12/2022 70    • WBC 10/12/2022 6 01    • RBC 10/12/2022 5 08    • Hemoglobin 10/12/2022 15 4    • Hematocrit 10/12/2022 48 9    • MCV 10/12/2022 96    • MCH 10/12/2022 30 3    • MCHC 10/12/2022 31 5    • RDW 10/12/2022 12 5    • MPV 10/12/2022 9 4    • Platelets 03/06/5479 299    • nRBC 10/12/2022 0    • Neutrophils Relative 10/12/2022 56    • Immat GRANS % 10/12/2022 0    • Lymphocytes Relative 10/12/2022 30    • Monocytes Relative 10/12/2022 9    • Eosinophils Relative 10/12/2022 4    • Basophils Relative 10/12/2022 1    • Neutrophils Absolute 10/12/2022 3 37    • Immature Grans Absolute 10/12/2022 0 01    • Lymphocytes Absolute 10/12/2022 1 79    • Monocytes Absolute 10/12/2022 0 53    • Eosinophils Absolute 10/12/2022 0 24    • Basophils Absolute 10/12/2022 0 07    • Total CK 10/12/2022 218    • D-Dimer, Quant 10/12/2022 <0 27    • CK-MB Index 10/12/2022 1 6    • CK-MB 10/12/2022 3 5      Lab Results   Component Value Date    TRIG 174 (H) 10/12/2022    TRIG 195 (H) 02/05/2019    HDL 52 10/12/2022    HDL 46 02/05/2019     Imaging: No results found  Review of Systems:  Review of Systems   Constitutional: Negative for activity change, appetite change, fatigue and fever  HENT: Negative for nosebleeds and sore throat  Eyes: Negative for photophobia and visual disturbance  Respiratory: Positive for shortness of breath  Negative for cough, chest tightness and wheezing  Cardiovascular: Negative for chest pain, palpitations and leg swelling  Gastrointestinal: Negative for abdominal pain, diarrhea, nausea and vomiting  Endocrine: Negative for polyuria  Genitourinary: Negative for dysuria, frequency and hematuria     Musculoskeletal: Negative for arthralgias, back pain and gait problem  Skin: Negative for pallor and rash  Neurological: Negative for dizziness, syncope, speech difficulty and light-headedness  Hematological: Does not bruise/bleed easily  Psychiatric/Behavioral: Negative for agitation, behavioral problems and confusion  Physical Exam:  Physical Exam  Vitals reviewed  Constitutional:       General: He is not in acute distress  Appearance: He is well-developed  He is not diaphoretic  HENT:      Head: Normocephalic and atraumatic  Nose: Nose normal    Eyes:      General: No scleral icterus  Pupils: Pupils are equal, round, and reactive to light  Neck:      Vascular: No JVD  Cardiovascular:      Rate and Rhythm: Normal rate and regular rhythm  Heart sounds: S1 normal and S2 normal  Heart sounds not distant  No murmur heard  No systolic murmur is present  No friction rub  No gallop  No S3 sounds  Pulmonary:      Effort: Pulmonary effort is normal  No respiratory distress  Breath sounds: Normal breath sounds  No wheezing or rales  Abdominal:      General: Bowel sounds are normal  There is no distension  Palpations: Abdomen is soft  Musculoskeletal:         General: No deformity  Cervical back: Normal range of motion and neck supple  Skin:     General: Skin is warm and dry  Findings: No erythema  Neurological:      Mental Status: He is alert and oriented to person, place, and time  Cranial Nerves: No cranial nerve deficit  Psychiatric:         Behavior: Behavior normal        Blood pressure (!) 198/88, pulse 94, height 5' 7 05" (1 703 m), weight 79 1 kg (174 lb 6 4 oz), SpO2 98 %  EKG:  Normal sinus rhythm  Incomplete right bundle branch block  ST & T wave abnormality, consider inferior ischemia  Abnormal ECG    Discussion/Summary:  CAD: He had an echo to assess for structural heart disease, which did not find any significant findings    He had a CCS to assess overall risk - which was 379, 76th percentile  Nuclear stress test was abnormal and so now s/p cardiac cath with FLORENTINO to 95% LCx lesion  Residual 40% lesion seen in [facility] for medical management  He remained fatigued and short of breath at prior visits, so B-blocker was discontinued considering asthma and symptoms seem to be still present  Continued on ASA, statin, Brilinta - switched from plavix due to fatigue with plavix  Samir Ruiz will be stopped at the end of November after a year of being on this  Dyspnea on exertion could be related to high blood pressure as well as Brilinta, so hopefully will improve  HLD: was diet controlled, , which was elevated in Sept 2021  CCS is moderate along with CAD seen on cath, so would benefit from a statin - tolerating Lipitor  LDL now 102 in Jan 2022  LDL 61 in October 2022 with taking Lipitor 3 days a week  HTN: intolerant of B-blocker due to asthma, and did not do well on lisinopril  He has since been changed to amlodipine, and this has not improved BP well  Will increase amlodipine to 10 mg to improve BP

## 2022-10-24 ENCOUNTER — OFFICE VISIT (OUTPATIENT)
Dept: INTERNAL MEDICINE CLINIC | Age: 67
End: 2022-10-24
Payer: MEDICARE

## 2022-10-24 VITALS
SYSTOLIC BLOOD PRESSURE: 182 MMHG | HEART RATE: 92 BPM | DIASTOLIC BLOOD PRESSURE: 94 MMHG | WEIGHT: 174.9 LBS | BODY MASS INDEX: 27.45 KG/M2 | TEMPERATURE: 98.6 F | HEIGHT: 67 IN | OXYGEN SATURATION: 97 %

## 2022-10-24 DIAGNOSIS — Z23 NEED FOR INFLUENZA VACCINATION: Primary | ICD-10-CM

## 2022-10-24 DIAGNOSIS — E78.2 MIXED HYPERLIPIDEMIA: ICD-10-CM

## 2022-10-24 DIAGNOSIS — Z12.5 SCREENING PSA (PROSTATE SPECIFIC ANTIGEN): ICD-10-CM

## 2022-10-24 DIAGNOSIS — I10 ESSENTIAL HYPERTENSION: ICD-10-CM

## 2022-10-24 DIAGNOSIS — R06.09 DOE (DYSPNEA ON EXERTION): ICD-10-CM

## 2022-10-24 DIAGNOSIS — J45.20 MILD INTERMITTENT ASTHMA WITHOUT COMPLICATION: ICD-10-CM

## 2022-10-24 PROCEDURE — 99214 OFFICE O/P EST MOD 30 MIN: CPT | Performed by: FAMILY MEDICINE

## 2022-10-24 PROCEDURE — 90662 IIV NO PRSV INCREASED AG IM: CPT

## 2022-10-24 PROCEDURE — G0008 ADMIN INFLUENZA VIRUS VAC: HCPCS

## 2022-10-24 RX ORDER — FLUTICASONE PROPIONATE AND SALMETEROL 250; 50 UG/1; UG/1
1 POWDER RESPIRATORY (INHALATION) 2 TIMES DAILY
Qty: 180 BLISTER | Refills: 3 | Status: SHIPPED | OUTPATIENT
Start: 2022-10-24 | End: 2023-01-22

## 2022-10-24 RX ORDER — METOPROLOL SUCCINATE 25 MG/1
25 TABLET, EXTENDED RELEASE ORAL DAILY
Qty: 30 TABLET | Refills: 1 | Status: SHIPPED | OUTPATIENT
Start: 2022-10-24

## 2022-10-24 RX ORDER — ALBUTEROL SULFATE 90 UG/1
2 AEROSOL, METERED RESPIRATORY (INHALATION) EVERY 6 HOURS PRN
Qty: 18 G | Refills: 3 | Status: SHIPPED | OUTPATIENT
Start: 2022-10-24

## 2022-10-24 NOTE — PROGRESS NOTES
Assessment/Plan:    1  Need for influenza vaccination  -     influenza vaccine, high-dose, PF 0 7 mL (FLUZONE HIGH-DOSE)    2  Essential hypertension  -     metoprolol succinate (TOPROL-XL) 25 mg 24 hr tablet; Take 1 tablet (25 mg total) by mouth daily    3  Mixed hyperlipidemia  -     Lipid Panel with Direct LDL reflex; Future  -     Comprehensive metabolic panel; Future  -     CBC and differential; Future    4  Screening PSA (prostate specific antigen)  -     PSA, Total Screen; Future    5  Mild intermittent asthma without complication  -     albuterol (Ventolin HFA) 90 mcg/act inhaler; Inhale 2 puffs every 6 (six) hours as needed for wheezing or shortness of breath  -     Fluticasone-Salmeterol (Advair Diskus) 250-50 mcg/dose inhaler; Inhale 1 puff 2 (two) times a day Rinse mouth after use  6  LAFLEUR (dyspnea on exertion)             There are no Patient Instructions on file for this visit  Return for 5 months f/up with labs  Subjective:      Patient ID: Lura Severin is a 78 y o  male  Chief Complaint   Patient presents with   • Follow-up     Fatigue  Review labs, XR abdomen 1 view  Pt  Has cologuard kit at home, will complete        HPI     Shortness of breath, patient was taken off lisinopril and feels that his shortness of breath has significantly improved  Had repeat PFT which is in his chart and essentially unchanged from previous with moderate obstructive disease  He was increased for from 250 mg Advair to 500 but notices no improvement with that  He would like to go back to 250 mg  He saw Cardiology who increased his amlodipine however his blood pressure still remains high  Patient denies any chest pain or shortness of breath  Essential hypertension, on amlodipine, increase most recently by Cardiology  Blood pressure readings are significantly elevated    He feels that Brilinta may be contributing since he usually has some strange side effects however he only has 6 weeks left until completion of his full course and then he will continue on aspirin  Patient would like to revisit metoprolol since originally with all his fatigue was from that however since stopping lisinopril his fatigue has largely improved  The following portions of the patient's history were reviewed and updated as appropriate: allergies, current medications, past family history, past medical history, past social history, past surgical history and problem list     Review of Systems      Constitutional:  Denies fever or chills   Eyes:  Denies double , blurry vision or eye pain  HENT:  Denies nasal congestion or sore throat   Respiratory:  Denies cough or shortness of breath or wheezing  Cardiovascular:  Denies palpitations or chest pain  GI:  Denies abdominal pain, nausea, or vomiting, no loose stools, no reflux  Integument:  Denies rash , no open areas  Neurologic:  Denies headache or focal weakness, no dizziness  : no dysuria, or hematuria        Current Outpatient Medications   Medication Sig Dispense Refill   • albuterol (Ventolin HFA) 90 mcg/act inhaler Inhale 2 puffs every 6 (six) hours as needed for wheezing or shortness of breath 18 g 3   • amLODIPine (NORVASC) 10 mg tablet Take 1 tablet (10 mg total) by mouth daily 90 tablet 3   • aspirin 81 mg chewable tablet Chew 1 tablet (81 mg total) daily 30 tablet 0   • atorvastatin (LIPITOR) 40 mg tablet Take 1 tablet by mouth once daily 30 tablet 3   • Fluticasone-Salmeterol (Advair Diskus) 250-50 mcg/dose inhaler Inhale 1 puff 2 (two) times a day Rinse mouth after use   180 blister 3   • metoprolol succinate (TOPROL-XL) 25 mg 24 hr tablet Take 1 tablet (25 mg total) by mouth daily 30 tablet 1   • ticagrelor (Brilinta) 90 MG Take 1 tablet (90 mg total) by mouth every 12 (twelve) hours 60 tablet 5   • albuterol (2 5 mg/3 mL) 0 083 % nebulizer solution Take 3 mL (2 5 mg total) by nebulization every 6 (six) hours as needed for wheezing or shortness of breath (Patient not taking: No sig reported) 100 mL 0   • montelukast (SINGULAIR) 10 mg tablet Take 1 tablet (10 mg total) by mouth daily at bedtime (Patient not taking: No sig reported) 30 tablet 5     No current facility-administered medications for this visit         Objective:    BP (!) 182/94 (BP Location: Left arm, Patient Position: Sitting, Cuff Size: Standard)   Pulse 92   Temp 98 6 °F (37 °C) (Temporal)   Ht 5' 7 05" (1 703 m)   Wt 79 3 kg (174 lb 14 4 oz)   SpO2 97%   BMI 27 35 kg/m²        Physical Exam         Constitutional:  Well developed, well nourished, no acute distress, non-toxic appearance   Eyes:  PERRL, conjunctiva normal , non icteric sclera  HENT:  Atraumatic, oropharynx moist  Neck-  supple   Respiratory:  CTA b/l, normal breath sounds, no rales, no wheezing   Cardiovascular:  RRR, no murmurs, no LE edema b/l  GI:  Soft, nondistended, normal bowel sounds x 4, nontender, no organomegaly, no mass, no rebound, no guarding   Neurologic:  no focal deficits noted   Psychiatric:  Speech and behavior appropriate , AAO x 3      [employer]

## 2022-11-18 PROBLEM — Z00.00 MEDICARE ANNUAL WELLNESS VISIT, SUBSEQUENT: Status: RESOLVED | Noted: 2022-09-19 | Resolved: 2022-11-18

## 2022-11-25 DIAGNOSIS — J45.20 MILD INTERMITTENT ASTHMA WITHOUT COMPLICATION: ICD-10-CM

## 2022-11-25 RX ORDER — FLUTICASONE PROPIONATE AND SALMETEROL 500; 50 UG/1; UG/1
1 POWDER RESPIRATORY (INHALATION) 2 TIMES DAILY
Qty: 60 BLISTER | Refills: 5 | Status: SHIPPED | OUTPATIENT
Start: 2022-11-25 | End: 2023-04-27

## 2022-12-14 ENCOUNTER — PATIENT MESSAGE (OUTPATIENT)
Dept: INTERNAL MEDICINE CLINIC | Age: 67
End: 2022-12-14

## 2022-12-21 ENCOUNTER — PATIENT MESSAGE (OUTPATIENT)
Dept: INTERNAL MEDICINE CLINIC | Age: 67
End: 2022-12-21

## 2022-12-21 DIAGNOSIS — J45.20 MILD INTERMITTENT ASTHMA WITHOUT COMPLICATION: ICD-10-CM

## 2022-12-21 DIAGNOSIS — I10 ESSENTIAL HYPERTENSION: ICD-10-CM

## 2022-12-21 RX ORDER — METOPROLOL SUCCINATE 25 MG/1
25 TABLET, EXTENDED RELEASE ORAL DAILY
Qty: 90 TABLET | Refills: 0 | Status: SHIPPED | OUTPATIENT
Start: 2022-12-21

## 2022-12-21 RX ORDER — ALBUTEROL SULFATE 90 UG/1
2 AEROSOL, METERED RESPIRATORY (INHALATION) EVERY 6 HOURS PRN
Qty: 18 G | Refills: 3 | Status: SHIPPED | OUTPATIENT
Start: 2022-12-21

## 2023-01-09 DIAGNOSIS — R06.83 SNORING: ICD-10-CM

## 2023-01-09 DIAGNOSIS — J45.20 MILD INTERMITTENT ASTHMA WITHOUT COMPLICATION: ICD-10-CM

## 2023-01-09 DIAGNOSIS — G47.10 HYPERSOMNIA, UNSPECIFIED: ICD-10-CM

## 2023-01-09 DIAGNOSIS — R53.83 OTHER FATIGUE: Primary | ICD-10-CM

## 2023-01-11 ENCOUNTER — PROCEDURE VISIT (OUTPATIENT)
Dept: INTERNAL MEDICINE CLINIC | Facility: OTHER | Age: 68
End: 2023-01-11

## 2023-01-11 VITALS
DIASTOLIC BLOOD PRESSURE: 88 MMHG | SYSTOLIC BLOOD PRESSURE: 164 MMHG | OXYGEN SATURATION: 97 % | HEIGHT: 68 IN | HEART RATE: 76 BPM | TEMPERATURE: 98.7 F | BODY MASS INDEX: 26.67 KG/M2 | WEIGHT: 176 LBS

## 2023-01-11 DIAGNOSIS — I10 ESSENTIAL HYPERTENSION: ICD-10-CM

## 2023-01-11 DIAGNOSIS — N20.0 RENAL CALCULI: ICD-10-CM

## 2023-01-11 DIAGNOSIS — L82.1 SEBORRHEIC KERATOSES: ICD-10-CM

## 2023-01-11 DIAGNOSIS — I25.10 CORONARY ARTERY DISEASE INVOLVING NATIVE CORONARY ARTERY OF NATIVE HEART WITHOUT ANGINA PECTORIS: Primary | ICD-10-CM

## 2023-01-11 DIAGNOSIS — J45.20 MILD INTERMITTENT ASTHMA WITHOUT COMPLICATION: ICD-10-CM

## 2023-01-11 RX ORDER — TAMSULOSIN HYDROCHLORIDE 0.4 MG/1
0.4 CAPSULE ORAL
Qty: 30 CAPSULE | Refills: 3 | Status: SHIPPED | OUTPATIENT
Start: 2023-01-11

## 2023-01-11 RX ORDER — FLUTICASONE PROPIONATE AND SALMETEROL 250; 50 UG/1; UG/1
POWDER RESPIRATORY (INHALATION)
COMMUNITY
Start: 2022-11-25

## 2023-01-11 RX ORDER — ALBUTEROL SULFATE 90 UG/1
2 AEROSOL, METERED RESPIRATORY (INHALATION) EVERY 6 HOURS PRN
Qty: 54 G | Refills: 3 | Status: SHIPPED | OUTPATIENT
Start: 2023-01-11

## 2023-01-11 NOTE — PROGRESS NOTES
Lesion Destruction    Date/Time: 1/11/2023 12:54 PM  Performed by: Shila Mazariegos DO  Authorized by: Shila Mazariegos DO   Universal Protocol:  Consent: Written consent obtained    Consent given by: patient  Patient understanding: patient states understanding of the procedure being performed  Patient consent: the patient's understanding of the procedure matches consent given  Patient identity confirmed: verbally with patient      Procedure Details - Lesion Destruction:     Number of Lesions:  2  Lesion 1:     Body area:  Head/neck    Head/neck location:  Forehead    Initial size (mm):  3    Final defect size (mm):  3    Malignancy: benign lesion      Destruction method: electrosurgery    Lesion 2:     Body area:  [address] location:  Forehead    Initial size (mm):  3    Final defect size (mm):  3    Malignancy: benign lesion      Destruction method: electrosurgery

## 2023-01-11 NOTE — PROGRESS NOTES
Assessment/Plan:    1  Coronary artery disease involving native coronary artery of native heart without angina pectoris  -     Ambulatory Referral to Cardiology; Future    2  Essential hypertension  -     Ambulatory Referral to Cardiology; Future    3  Mild intermittent asthma without complication  -     Ambulatory Referral to Pulmonology; Future  -     albuterol (Ventolin HFA) 90 mcg/act inhaler; Inhale 2 puffs every 6 (six) hours as needed for wheezing or shortness of breath    4  Atypical pigmented skin lesion    5  Renal calculi  -     tamsulosin (FLOMAX) 0 4 mg; Take 1 capsule (0 4 mg total) by mouth daily with dinner            There are no Patient Instructions on file for this visit  Return for Next scheduled follow up  Subjective:      Patient ID: Jose Eduardo Linda is a 75 y o  male  Chief Complaint   Patient presents with   • Facial Bumps     Pt here for removal of small like bumps on forehead, he states this was done here in the past        HPI       Fatigue and shortness of breath, patient has been feeling this way since he had his cardiac catheterization and stent placed  He has gone back to cardiology but there are changes have not helped  Originally it was thought that it was Brilinta versus lisinopril and both have been stopped and he states he is slightly better but previously prior to this he could walk miles a day and now he struggles to do 5 minutes of exercising  He gets short of breath when he walks to his car in the parking lot  He has fatigue and does snore at night on a regular basis  He is not sure if he is has sleep apnea has never never been checked before  He does not feel that he has and would not like to be checked  He states he does not wake up tired but he does snore all night long and that is not new for him    He is also been noticing that his blood pressure has been remaining high despite his changes in his diet and medications    The following portions of the patient's history were reviewed and updated as appropriate: allergies, current medications, past family history, past medical history, past social history, past surgical history and problem list     Review of Systems      Constitutional:  Denies fever or chills   Eyes:  Denies double , blurry vision or eye pain  HENT:  Denies nasal congestion or sore throat   Respiratory:  Denies cough + shortness of breath + wheezing  Cardiovascular:  Denies palpitations or chest pain  GI:  Denies abdominal pain, nausea, or vomiting, no loose stools, no reflux  Integument:  Denies rash , no open areas  Neurologic:  Denies headache or focal weakness, no dizziness  : no dysuria, or hematuria      Current Outpatient Medications   Medication Sig Dispense Refill   • albuterol (2 5 mg/3 mL) 0 083 % nebulizer solution Take 3 mL (2 5 mg total) by nebulization every 6 (six) hours as needed for wheezing or shortness of breath (Patient taking differently: Take 2 5 mg by nebulization if needed for wheezing or shortness of breath Hasn't needed but keeps it just incase) 100 mL 0   • albuterol (Ventolin HFA) 90 mcg/act inhaler Inhale 2 puffs every 6 (six) hours as needed for wheezing or shortness of breath 54 g 3   • amLODIPine (NORVASC) 10 mg tablet Take 1 tablet (10 mg total) by mouth daily 90 tablet 3   • aspirin 81 mg chewable tablet Chew 1 tablet (81 mg total) daily 30 tablet 0   • atorvastatin (LIPITOR) 40 mg tablet Take 1 tablet by mouth once daily 30 tablet 3   • Fluticasone-Salmeterol (Advair Diskus) 500-50 mcg/dose inhaler Inhale 1 puff 2 (two) times a day Rinse mouth after use   60 blister 5   • Fluticasone-Salmeterol (Advair) 250-50 mcg/dose inhaler      • metoprolol succinate (TOPROL-XL) 25 mg 24 hr tablet Take 1 tablet (25 mg total) by mouth daily 90 tablet 0   • tamsulosin (FLOMAX) 0 4 mg Take 1 capsule (0 4 mg total) by mouth daily with dinner 30 capsule 3   • montelukast (SINGULAIR) 10 mg tablet Take 1 tablet (10 mg total) by mouth daily at bedtime (Patient not taking: No sig reported) 30 tablet 5   • ticagrelor (Brilinta) 90 MG Take 1 tablet (90 mg total) by mouth every 12 (twelve) hours 60 tablet 5     No current facility-administered medications for this visit         Objective:    /88 (BP Location: Left arm, Patient Position: Sitting, Cuff Size: Standard)   Pulse 76   Temp 98 7 °F (37 1 °C) (Temporal)   Ht 5' 8" (1 727 m)   Wt 79 8 kg (176 lb)   SpO2 97% Comment: room air  BMI 26 76 kg/m²        Physical Exam      Constitutional:  Well developed, well nourished, no acute distress, non-toxic appearance   Eyes:  PERRL, conjunctiva normal , non icteric sclera  HENT:  Atraumatic, oropharynx moist  Neck-  supple   Respiratory:  CTA b/l, normal breath sounds, no rales, no wheezing   Cardiovascular:  RRR, no murmurs, no LE edema b/l  GI:  Soft, nondistended, normal bowel sounds x 4, nontender, no organomegaly, no mass, no rebound, no guarding   Neurologic:  no focal deficits noted   Psychiatric:  Speech and behavior appropriate , AAO x 3           Willma Batters, DO

## 2023-02-01 ENCOUNTER — TELEPHONE (OUTPATIENT)
Dept: PULMONOLOGY | Facility: CLINIC | Age: 68
End: 2023-02-01

## 2023-02-01 NOTE — TELEPHONE ENCOUNTER
Left message for patient to schedule an appointment, per referral, at our [facility]

## 2023-02-02 ENCOUNTER — VBI (OUTPATIENT)
Dept: ADMINISTRATIVE | Facility: OTHER | Age: 68
End: 2023-02-02

## 2023-02-09 ENCOUNTER — VBI (OUTPATIENT)
Dept: ADMINISTRATIVE | Facility: OTHER | Age: 68
End: 2023-02-09

## 2023-02-13 ENCOUNTER — APPOINTMENT (OUTPATIENT)
Dept: LAB | Age: 68
End: 2023-02-13

## 2023-02-13 DIAGNOSIS — R53.83 OTHER FATIGUE: ICD-10-CM

## 2023-02-13 DIAGNOSIS — E78.2 MIXED HYPERLIPIDEMIA: ICD-10-CM

## 2023-02-13 DIAGNOSIS — Z12.5 SCREENING PSA (PROSTATE SPECIFIC ANTIGEN): ICD-10-CM

## 2023-02-13 LAB
ALBUMIN SERPL BCP-MCNC: 3.7 G/DL (ref 3.5–5)
ALP SERPL-CCNC: 59 U/L (ref 46–116)
ALT SERPL W P-5'-P-CCNC: 28 U/L (ref 12–78)
ANION GAP SERPL CALCULATED.3IONS-SCNC: 7 MMOL/L (ref 4–13)
AST SERPL W P-5'-P-CCNC: 18 U/L (ref 5–45)
BASOPHILS # BLD AUTO: 0.08 THOUSANDS/ÂΜL (ref 0–0.1)
BASOPHILS NFR BLD AUTO: 1 % (ref 0–1)
BILIRUB SERPL-MCNC: 0.57 MG/DL (ref 0.2–1)
BUN SERPL-MCNC: 21 MG/DL (ref 5–25)
CALCIUM SERPL-MCNC: 8.9 MG/DL (ref 8.3–10.1)
CHLORIDE SERPL-SCNC: 106 MMOL/L (ref 96–108)
CHOLEST SERPL-MCNC: 171 MG/DL
CO2 SERPL-SCNC: 26 MMOL/L (ref 21–32)
CREAT SERPL-MCNC: 1.12 MG/DL (ref 0.6–1.3)
EOSINOPHIL # BLD AUTO: 0.19 THOUSAND/ÂΜL (ref 0–0.61)
EOSINOPHIL NFR BLD AUTO: 3 % (ref 0–6)
ERYTHROCYTE [DISTWIDTH] IN BLOOD BY AUTOMATED COUNT: 12.3 % (ref 11.6–15.1)
GFR SERPL CREATININE-BSD FRML MDRD: 67 ML/MIN/1.73SQ M
GLUCOSE P FAST SERPL-MCNC: 89 MG/DL (ref 65–99)
HCT VFR BLD AUTO: 50.1 % (ref 36.5–49.3)
HDLC SERPL-MCNC: 65 MG/DL
HGB BLD-MCNC: 16.9 G/DL (ref 12–17)
IMM GRANULOCYTES # BLD AUTO: 0.01 THOUSAND/UL (ref 0–0.2)
IMM GRANULOCYTES NFR BLD AUTO: 0 % (ref 0–2)
LDLC SERPL CALC-MCNC: 67 MG/DL (ref 0–100)
LYMPHOCYTES # BLD AUTO: 1.6 THOUSANDS/ÂΜL (ref 0.6–4.47)
LYMPHOCYTES NFR BLD AUTO: 22 % (ref 14–44)
MCH RBC QN AUTO: 31 PG (ref 26.8–34.3)
MCHC RBC AUTO-ENTMCNC: 33.7 G/DL (ref 31.4–37.4)
MCV RBC AUTO: 92 FL (ref 82–98)
MONOCYTES # BLD AUTO: 0.61 THOUSAND/ÂΜL (ref 0.17–1.22)
MONOCYTES NFR BLD AUTO: 9 % (ref 4–12)
NEUTROPHILS # BLD AUTO: 4.71 THOUSANDS/ÂΜL (ref 1.85–7.62)
NEUTS SEG NFR BLD AUTO: 65 % (ref 43–75)
NRBC BLD AUTO-RTO: 0 /100 WBCS
PLATELET # BLD AUTO: 320 THOUSANDS/UL (ref 149–390)
PMV BLD AUTO: 9.9 FL (ref 8.9–12.7)
POTASSIUM SERPL-SCNC: 4.4 MMOL/L (ref 3.5–5.3)
PROT SERPL-MCNC: 7 G/DL (ref 6.4–8.4)
PSA SERPL-MCNC: 2.1 NG/ML (ref 0–4)
RBC # BLD AUTO: 5.45 MILLION/UL (ref 3.88–5.62)
SODIUM SERPL-SCNC: 139 MMOL/L (ref 135–147)
TRIGL SERPL-MCNC: 193 MG/DL
TSH SERPL DL<=0.05 MIU/L-ACNC: 2.8 UIU/ML (ref 0.45–4.5)
WBC # BLD AUTO: 7.2 THOUSAND/UL (ref 4.31–10.16)

## 2023-02-15 ENCOUNTER — VBI (OUTPATIENT)
Dept: ADMINISTRATIVE | Facility: OTHER | Age: 68
End: 2023-02-15

## 2023-02-20 ENCOUNTER — VBI (OUTPATIENT)
Dept: ADMINISTRATIVE | Facility: OTHER | Age: 68
End: 2023-02-20

## 2023-02-23 ENCOUNTER — VBI (OUTPATIENT)
Dept: ADMINISTRATIVE | Facility: OTHER | Age: 68
End: 2023-02-23

## 2023-03-02 NOTE — PROGRESS NOTES
Assessment/Plan:    1  Coronary artery disease involving native coronary artery of native heart without angina pectoris    2  LAFLEUR (dyspnea on exertion)    3  Essential hypertension  -     Comprehensive metabolic panel; Future    4  Mild intermittent asthma without complication    5  Mixed hyperlipidemia  -     Lipid Panel with Direct LDL reflex; Future    6  Stage 3a chronic kidney disease ([facility] [address] )    Continue to monitor blood pressure, cardiology notes reviewed with him  There are no Patient Instructions on file for this visit  Return in about 6 months (around 9/6/2023) for Recheck  Subjective:      Patient ID: Ian Nogueira is a 75 y o  male  Chief Complaint   Patient presents with   • Follow-up     5 MONTH F/U labs 2/13   • HM     Colonoscopy- refused        HPI    Shortness of breath, patient was taken off lisinopril and feels that his shortness of breath has significantly improved  Had repeat PFT which is in his chart and essentially unchanged from previous with moderate obstructive disease  He was increased for from 250 mg Advair to 500 but notices no improvement with that  He would like to go back to 250 mg  He saw Cardiology who increased his amlodipine however his blood pressure still remains high  Patient denies any chest pain or shortness of breath  March 2023, no significant improvement, no weight gain since last visit but over the 2 years has put on about 6 or 7 pounds which are central, he feels that this is affecting his breathing especially when he bends down due to asthma       Essential hypertension, on amlodipine, increase most recently by Cardiology  Blood pressure readings are significantly elevated  He feels that Brilinta may be contributing since he usually has some strange side effects however he only has 6 weeks left until completion of his full course and then he will continue on aspirin    Patient would like to revisit metoprolol since originally with all his fatigue was from that however since stopping lisinopril his fatigue has largely improved    march 2023: Improved since seeing cardiology, was placed on verapamil but after 3 days had side effects  Taken off metoprolol, now started on Bystolic which has been working well and he is tolerating it  Home blood pressure readings are very well controlled       Asthma, intermittent, has some wheezing more so from his throat on a regular basis  Toby Monahan uses Advair once a day even though he knows he is supposed to do it twice a day   Not on antihistamine or mast cell stabilizer   Uses albuterol inhaler every single morning once a day  [facility] is very energetic and works long hours in [employer] and also goes to [employer] and walks as exercise   He does not have baseline shortness of breath  July 2021, had PFTs done in January which showed FEV1 of 56%   At 1 point he has severe asthma and was on Advair 500 mg and currently is on 250  His FEV1 to use to be 46% according to him   His last PFT was done many many years ago   No recent exacerbations requiring ER visits hospitalizations or prednisone  Sept 2021: stable, no exacerbations, has had LAFLEUR with heavy lifting in the yard  September 2022, has been having dyspnea on exertion and decreased endurance this year  Was seen in December for similar symptoms and workup was negative  Since that time patient states he has not gotten any better and he cannot go up 1-2 flights of steps without stopping to catch his breath which is completely abnormal for him  He had a drug-eluting stent placed but did not have his summer follow-up due to not being able to contact the office  He feels his difficulties are from lisinopril and Brilinta and he would like both changed  Patient states he did stop them for 2 days until better  He states his abdomen has been getting larger without any change in weight  He denies constipation nausea vomiting or bloating    He is not having any wheezing at home and at rest he has no shortness of breath  He has not needed his rescue inhaler but and he is compliant with his medications  March 2023, no recent exacerbations     Hyperlipidemia, currently not on medications   His cholesterol does fluctuate from time to time  [facility] does have family history but he eats very well and needs approximately 5-6 very small meals throughout the day on a regular basis  July 2021, last LDL remains elevated   Patient not on statin and did not get his stress test as recommended  [facility] was concerned with the dobutamine and is asthma  [facility] is requesting alternative testing  [facility] has significant early family history in his father who had 3 acute MI eyes and then underwent chelation therapy which was beneficial to him  Autumn Carrero occasionally has intermittent chest discomfort but nothing that lasts or makes him worried to go to the emergency room  [facility] is very active and works and is yard   Sept 2021: had labs done, improved lipids, not on meds, Ct calcium score Is resulted he is here for results  September 2022, last lab work done in December showed normal levels  Due for lab work now  March 2023, stable with increase in triglycerides recently     Drug-eluting stent placed less than 1 year ago  Was on Plavix it was making him tired with metoprolol and both were changed to Brilinta and lisinopril  He feels that he is not any better and his dyspnea on exertion and fatigue and lack of endurance or due to these medications  He did not have a 6 month follow-up scheduled by Cardiology and now it is at 9 months    March 2023, [facility]        The following portions of the patient's history were reviewed and updated as appropriate: allergies, current medications, past family history, past medical history, past social history, past surgical history and problem list     Review of Systems        Constitutional:  Denies fever or chills   Eyes:  Denies double , blurry vision or eye pain  HENT:  Denies nasal congestion, sore throat or new hearing issues  Respiratory:  Denies cough or shortness of breath or wheezing  Cardiovascular:  Denies palpitations or chest pain  GI:  Denies abdominal pain, nausea, or vomiting, no loose stools, no reflux  Integument:  Denies rash , no open areas  Neurologic:  Denies headache or focal weakness, no dizziness  : no dysuria, or hematuria        Current Outpatient Medications   Medication Sig Dispense Refill   • albuterol (Ventolin HFA) 90 mcg/act inhaler Inhale 2 puffs every 6 (six) hours as needed for wheezing or shortness of breath 54 g 3   • amLODIPine (NORVASC) 10 mg tablet Take 1 tablet (10 mg total) by mouth daily 90 tablet 3   • aspirin 81 mg chewable tablet Chew 1 tablet (81 mg total) daily 30 tablet 0   • atorvastatin (LIPITOR) 40 mg tablet Take 1 tablet by mouth once daily 30 tablet 3   • Fluticasone-Salmeterol (Advair Diskus) 500-50 mcg/dose inhaler Inhale 1 puff 2 (two) times a day Rinse mouth after use  60 blister 5   • Fluticasone-Salmeterol (Advair) 250-50 mcg/dose inhaler      • nebivolol (BYSTOLIC) 2 5 mg tablet      • tamsulosin (FLOMAX) 0 4 mg Take 1 capsule (0 4 mg total) by mouth daily with dinner 30 capsule 3   • albuterol (2 5 mg/3 mL) 0 083 % nebulizer solution Take 3 mL (2 5 mg total) by nebulization every 6 (six) hours as needed for wheezing or shortness of breath (Patient taking differently: Take 2 5 mg by nebulization if needed for wheezing or shortness of breath Hasn't needed but keeps it just incase) 100 mL 0     No current facility-administered medications for this visit         Objective:    /78 (BP Location: Left arm, Patient Position: Sitting, Cuff Size: Adult)   Pulse 70   Temp 98 3 °F (36 8 °C) (Temporal)   Ht 5' 8" (1 727 m)   Wt 79 4 kg (175 lb)   SpO2 96%   BMI 26 61 kg/m²        Physical Exam         Constitutional:  Well developed, well nourished, no acute distress, non-toxic appearance   Eyes:  PERRL, conjunctiva normal , non icteric sclera  HENT:  Atraumatic, oropharynx moist  Neck-  supple   Respiratory:  CTA b/l, normal breath sounds, no rales, no wheezing   Cardiovascular:  RRR, no murmurs, no LE edema b/l  GI:  Soft, nondistended, normal bowel sounds x 4, nontender, no organomegaly, no mass, no rebound, no guarding   Neurologic:  no focal deficits noted   Psychiatric:  Speech and behavior appropriate , AAO x 3          Giovanni Marie DO Nsaids Counseling: NSAID Counseling: I discussed with the patient that NSAIDs should be taken with food. Prolonged use of NSAIDs can result in the development of stomach ulcers.  Patient advised to stop taking NSAIDs if abdominal pain occurs.  The patient verbalized understanding of the proper use and possible adverse effects of NSAIDs.  All of the patient's questions and concerns were addressed.

## 2023-03-06 ENCOUNTER — OFFICE VISIT (OUTPATIENT)
Dept: INTERNAL MEDICINE CLINIC | Age: 68
End: 2023-03-06

## 2023-03-06 VITALS
HEIGHT: 68 IN | WEIGHT: 175 LBS | HEART RATE: 70 BPM | OXYGEN SATURATION: 96 % | SYSTOLIC BLOOD PRESSURE: 140 MMHG | DIASTOLIC BLOOD PRESSURE: 78 MMHG | BODY MASS INDEX: 26.52 KG/M2 | TEMPERATURE: 98.3 F

## 2023-03-06 DIAGNOSIS — R06.09 DOE (DYSPNEA ON EXERTION): ICD-10-CM

## 2023-03-06 DIAGNOSIS — I10 ESSENTIAL HYPERTENSION: ICD-10-CM

## 2023-03-06 DIAGNOSIS — E78.2 MIXED HYPERLIPIDEMIA: ICD-10-CM

## 2023-03-06 DIAGNOSIS — J45.20 MILD INTERMITTENT ASTHMA WITHOUT COMPLICATION: ICD-10-CM

## 2023-03-06 DIAGNOSIS — I25.10 CORONARY ARTERY DISEASE INVOLVING NATIVE CORONARY ARTERY OF NATIVE HEART WITHOUT ANGINA PECTORIS: Primary | ICD-10-CM

## 2023-03-06 DIAGNOSIS — N18.31 STAGE 3A CHRONIC KIDNEY DISEASE (HCC): ICD-10-CM

## 2023-03-06 RX ORDER — NEBIVOLOL 2.5 MG/1
TABLET ORAL
COMMUNITY
Start: 2023-02-09

## 2023-03-29 DIAGNOSIS — I10 ESSENTIAL HYPERTENSION: Primary | ICD-10-CM

## 2023-03-29 NOTE — TELEPHONE ENCOUNTER
----- Message from Kenny Arango sent at 3/28/2023  5:55 PM EDT -----  Regarding: Refill  Contact: [phone]  Claire Doc,  For some reason, I cannot refill the Bystolic prescription, other than leaving a message in this area  If [facility], that would be great    Thanks,  Ulisses Chambers

## 2023-03-29 NOTE — TELEPHONE ENCOUNTER
Last ov 3/6/23  Next ov 0/47/33    Bystolic was started by Cardiologist,  Dr Abdiel Rosen on 2/9/23  Patient states that Dr Carla Kellogg agreed to refill all his medication at his last office visit

## 2023-03-30 RX ORDER — NEBIVOLOL 2.5 MG/1
2.5 TABLET ORAL DAILY
Qty: 90 TABLET | Refills: 1 | Status: SHIPPED | OUTPATIENT
Start: 2023-03-30

## 2023-04-04 DIAGNOSIS — E78.2 MIXED HYPERLIPIDEMIA: ICD-10-CM

## 2023-04-04 DIAGNOSIS — Z82.49 FAMILY HISTORY OF EARLY CAD: ICD-10-CM

## 2023-04-04 RX ORDER — ATORVASTATIN CALCIUM 40 MG/1
TABLET, FILM COATED ORAL
Qty: 30 TABLET | Refills: 11 | Status: SHIPPED | OUTPATIENT
Start: 2023-04-04

## 2023-04-25 ENCOUNTER — TELEPHONE (OUTPATIENT)
Dept: INTERNAL MEDICINE CLINIC | Age: 68
End: 2023-04-25

## 2023-04-25 DIAGNOSIS — J45.20 MILD INTERMITTENT ASTHMA WITHOUT COMPLICATION: ICD-10-CM

## 2023-04-25 DIAGNOSIS — J45.20 MILD INTERMITTENT ASTHMA WITHOUT COMPLICATION: Primary | ICD-10-CM

## 2023-04-25 NOTE — TELEPHONE ENCOUNTER
Patient is requesting rx fluticasone-umeclidinium-vilanterol 100-62 5-25 mcg/actuation inhaler to be sent electronically to [address] [facility], [address] [place] [address] 321

## 2023-04-26 ENCOUNTER — TELEPHONE (OUTPATIENT)
Dept: INTERNAL MEDICINE CLINIC | Age: 68
End: 2023-04-26

## 2023-04-26 NOTE — TELEPHONE ENCOUNTER
rec'd prior auth for medication:    fluticasone-umeclidinium-vilanterol 100-62 5-25 mcg/actuation inhaler       Scanning into media and giving to Katarzyna/placing in bin

## 2023-06-26 ENCOUNTER — TELEPHONE (OUTPATIENT)
Dept: INTERNAL MEDICINE CLINIC | Age: 68
End: 2023-06-26

## 2023-06-26 DIAGNOSIS — J45.20 MILD INTERMITTENT ASTHMA WITHOUT COMPLICATION: Primary | ICD-10-CM

## 2023-06-26 RX ORDER — FLUTICASONE PROPIONATE AND SALMETEROL 500; 50 UG/1; UG/1
1 POWDER RESPIRATORY (INHALATION) 2 TIMES DAILY
Qty: 60 BLISTER | Refills: 0 | Status: SHIPPED | OUTPATIENT
Start: 2023-06-26 | End: 2023-07-14 | Stop reason: SDUPTHER

## 2023-06-26 NOTE — TELEPHONE ENCOUNTER
Patient stopped by the office to request the refill on the Advair 500/50 mg  He stated that the Trelegy was increased his blood pressure and also increased the swelling in his legs  Stopped taking it      Patient would like to have the Advair refilled but needs to state yes to this for us to contact the pharmacy to let them know this and need to be taken off the medication list for the pharmacy to cancel the order for the Trelegy       Please call him back when completed

## 2023-07-14 ENCOUNTER — OFFICE VISIT (OUTPATIENT)
Dept: INTERNAL MEDICINE CLINIC | Facility: OTHER | Age: 68
End: 2023-07-14
Payer: MEDICARE

## 2023-07-14 VITALS
TEMPERATURE: 99.2 F | BODY MASS INDEX: 26.67 KG/M2 | SYSTOLIC BLOOD PRESSURE: 140 MMHG | DIASTOLIC BLOOD PRESSURE: 78 MMHG | HEIGHT: 68 IN | HEART RATE: 83 BPM | WEIGHT: 176 LBS | OXYGEN SATURATION: 96 %

## 2023-07-14 DIAGNOSIS — Z12.11 ENCOUNTER FOR SCREENING FOR MALIGNANT NEOPLASM OF COLON: ICD-10-CM

## 2023-07-14 DIAGNOSIS — J45.20 MILD INTERMITTENT ASTHMA WITHOUT COMPLICATION: Primary | ICD-10-CM

## 2023-07-14 DIAGNOSIS — R06.09 DOE (DYSPNEA ON EXERTION): ICD-10-CM

## 2023-07-14 PROBLEM — N18.31 STAGE 3A CHRONIC KIDNEY DISEASE (HCC): Status: RESOLVED | Noted: 2022-09-19 | Resolved: 2023-07-14

## 2023-07-14 PROCEDURE — 99213 OFFICE O/P EST LOW 20 MIN: CPT | Performed by: FAMILY MEDICINE

## 2023-07-14 RX ORDER — FLUTICASONE PROPIONATE AND SALMETEROL 500; 50 UG/1; UG/1
1 POWDER RESPIRATORY (INHALATION) 2 TIMES DAILY
Qty: 60 BLISTER | Refills: 5 | Status: SHIPPED | OUTPATIENT
Start: 2023-07-14 | End: 2023-10-03

## 2023-07-14 NOTE — PROGRESS NOTES
Assessment/Plan:    1. Mild intermittent asthma without complication  -     Fluticasone-Salmeterol (Advair Diskus) 500-50 mcg/dose inhaler; Inhale 1 puff 2 (two) times a day Rinse mouth after use.  -     Ambulatory Referral to Otolaryngology; Future  -     Ambulatory Referral to Pulmonology; Future    2. Encounter for screening for malignant neoplasm of colon    3. LAFLEUR (dyspnea on exertion)  -     Ambulatory Referral to Otolaryngology; Future  -     Ambulatory Referral to Pulmonology; Future        Depression Screening and Follow-up Plan: Patient was screened for depression during today's encounter. They screened negative with a PHQ-2 score of 0.         There are no Patient Instructions on file for this visit.    Return for Next scheduled follow up.    Subjective:      Patient ID: Harinder Ventura is a 68 y.o. male.    Chief Complaint   Patient presents with    Follow-up     Asthma follow up       Shortness of Breath  The current episode started more than 1 year ago. The problem occurs daily. The problem is unchanged. Associated symptoms include wheezing. The symptoms are aggravated by occupational exposure, smoke, exercise, odors, URIs and weather changes.        Answers for HPI/ROS submitted by the patient on 7/7/2023  Chronicity: recurrent  sputum production: Yes      Shortness of breath, patient was taken off lisinopril and feels that his shortness of breath has significantly improved.  Had repeat PFT which is in his chart and essentially unchanged from previous with moderate obstructive disease.  He was increased for from 250 mg Advair to 500 but notices no improvement with that.  He would like to go back to 250 mg.  He saw Cardiology who increased his amlodipine however his blood pressure still remains high.  Patient denies any chest pain or shortness of breath.  March 2023, no significant improvement, no weight gain since last visit but over the 2 years has put on about 6 or 7 pounds which are central, he feels  that this is affecting his breathing especially when he bends down due to asthma  July 2023, still feels that he is not any better and has decreased his daily workload due to the    Asthma, intermittent, has some wheezing more so from his throat on a regular basis.  Only uses Advair once a day even though he knows he is supposed to do it twice a day.  Not on antihistamine or mast cell stabilizer.  Uses albuterol inhaler every single morning once a day.  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  He does not have baseline shortness of breath.  July 2021, had PFTs done in January which showed FEV1 of 56%.  At 1 point he has severe asthma and was on Advair 500 mg and currently is on 250. His FEV1 to use to be 46% according to him.  His last PFT was done many many years ago.  No recent exacerbations requiring ER visits hospitalizations or prednisone  Sept 2021: stable, no exacerbations, has had LAFLEUR with heavy lifting in the yard  September 2022, has been having dyspnea on exertion and decreased endurance this year.  Was seen in December for similar symptoms and workup was negative.  Since that time patient states he has not gotten any better and he cannot go up 1-2 flights of steps without stopping to catch his breath which is completely abnormal for him.  He had a drug-eluting stent placed but did not have his summer follow-up due to not being able to contact the office.  He feels his difficulties are from lisinopril and Brilinta and he would like both changed.  Patient states he did stop them for 2 days until better.  He states his abdomen has been getting larger without any change in weight.  He denies constipation nausea vomiting or bloating.  He is not having any wheezing at home and at rest he has no shortness of breath.  He has not needed his rescue inhaler but and he is compliant with his medications.  March 2023, no recent exacerbations  July  2023, feels that he is not any better overall, following with cardiology and is not able to do his heavy workload and physical activity around the house as previous     Answers for HPI/ROS submitted by the patient on 7/7/2023  Chronicity: recurrent  sputum production: Yes          The following portions of the patient's history were reviewed and updated as appropriate: allergies, current medications, past family history, past medical history, past social history, past surgical history and problem list.    Review of Systems   Respiratory:  Positive for shortness of breath and wheezing.          Constitutional:  Denies fever or chills   Eyes:  Denies double , blurry vision or eye pain  HENT:  Denies nasal congestion, sore throat or new hearing issues  Respiratory:  Denies cough or shortness of breath or wheezing  Cardiovascular:  Denies palpitations or chest pain  GI:  Denies abdominal pain, nausea, or vomiting, no loose stools, no reflux  Integument:  Denies rash , no open areas  Neurologic:  Denies headache or focal weakness, no dizziness  : no dysuria, or hematuria        Current Outpatient Medications   Medication Sig Dispense Refill    albuterol (2.5 mg/3 mL) 0.083 % nebulizer solution Take 3 mL (2.5 mg total) by nebulization every 6 (six) hours as needed for wheezing or shortness of breath (Patient not taking: Reported on 9/20/2023) 100 mL 0    aspirin 81 mg chewable tablet Chew 1 tablet (81 mg total) daily 30 tablet 0    atorvastatin (LIPITOR) 40 mg tablet Take 1 tablet by mouth once daily (Patient not taking: Reported on 9/20/2023) 30 tablet 11    Fluticasone-Salmeterol (Advair Diskus) 500-50 mcg/dose inhaler Inhale 1 puff 2 (two) times a day Rinse mouth after use. 60 blister 5    albuterol (ProAir HFA) 90 mcg/act inhaler Inhale 2 puffs every 6 (six) hours as needed for wheezing 18 g 5    amLODIPine (NORVASC) 10 mg tablet Take 1 tablet (10 mg total) by mouth daily 90 tablet 0    ezetimibe (ZETIA) 10 mg  "tablet Take 10 mg by mouth daily      Icosapent Ethyl 1 g CAPS Take 1 capsule by mouth 2 (two) times a day      nebivolol (BYSTOLIC) 2.5 mg tablet Take 1 tablet (2.5 mg total) by mouth daily 90 tablet 0    rosuvastatin (CRESTOR) 10 MG tablet Take 10 mg by mouth daily      tamsulosin (FLOMAX) 0.4 mg Take 1 capsule (0.4 mg total) by mouth daily with dinner 90 capsule 0    tiotropium (Spiriva Respimat) 2.5 MCG/ACT AERS inhaler Inhale 2 puffs daily 4 g 2     No current facility-administered medications for this visit.       Objective:    /78 (BP Location: Left arm, Patient Position: Sitting, Cuff Size: Adult)   Pulse 83   Temp 99.2 °F (37.3 °C) (Temporal)   Ht 5' 8\" (1.727 m)   Wt 79.8 kg (176 lb)   SpO2 96% Comment: ra  BMI 26.76 kg/m²        Physical Exam      Constitutional:  Well developed, well nourished, no acute distress, non-toxic appearance   Eyes:  PERRL, conjunctiva normal , non icteric sclera  HENT:  Atraumatic, oropharynx moist. Neck-  supple   Respiratory:  CTA b/l, normal breath sounds, no rales, no wheezing   Cardiovascular:  RRR, no murmurs, no LE edema b/l  GI:  Soft, nondistended, normal bowel sounds x 4, nontender, no organomegaly, no mass, no rebound, no guarding   Neurologic:  no focal deficits noted   Psychiatric:  Speech and behavior appropriate , AAO x 3             Noelle Early DO  "

## 2023-07-20 DIAGNOSIS — N20.0 RENAL CALCULI: ICD-10-CM

## 2023-07-20 RX ORDER — TAMSULOSIN HYDROCHLORIDE 0.4 MG/1
CAPSULE ORAL
Qty: 30 CAPSULE | Refills: 0 | OUTPATIENT
Start: 2023-07-20

## 2023-07-21 DIAGNOSIS — I10 ESSENTIAL HYPERTENSION: ICD-10-CM

## 2023-07-21 RX ORDER — NEBIVOLOL 2.5 MG/1
2.5 TABLET ORAL DAILY
Qty: 90 TABLET | Refills: 0 | Status: SHIPPED | OUTPATIENT
Start: 2023-07-21

## 2023-08-11 ENCOUNTER — TELEPHONE (OUTPATIENT)
Dept: PULMONOLOGY | Facility: CLINIC | Age: 68
End: 2023-08-11

## 2023-08-11 NOTE — TELEPHONE ENCOUNTER
Called and left message for patient to schedule a consult appointment, per referral, with any provider at our [facility] office.

## 2023-08-16 ENCOUNTER — APPOINTMENT (OUTPATIENT)
Dept: LAB | Age: 68
End: 2023-08-16
Payer: MEDICARE

## 2023-08-16 DIAGNOSIS — E78.2 MIXED HYPERLIPIDEMIA: ICD-10-CM

## 2023-08-16 DIAGNOSIS — I10 ESSENTIAL HYPERTENSION: ICD-10-CM

## 2023-08-16 LAB
ALBUMIN SERPL BCP-MCNC: 3.6 G/DL (ref 3.5–5)
ALP SERPL-CCNC: 65 U/L (ref 46–116)
ALT SERPL W P-5'-P-CCNC: 51 U/L (ref 12–78)
ANION GAP SERPL CALCULATED.3IONS-SCNC: 3 MMOL/L
AST SERPL W P-5'-P-CCNC: 51 U/L (ref 5–45)
BILIRUB SERPL-MCNC: 0.43 MG/DL (ref 0.2–1)
BUN SERPL-MCNC: 17 MG/DL (ref 5–25)
CALCIUM SERPL-MCNC: 8.9 MG/DL (ref 8.3–10.1)
CHLORIDE SERPL-SCNC: 106 MMOL/L (ref 96–108)
CHOLEST SERPL-MCNC: 161 MG/DL
CO2 SERPL-SCNC: 28 MMOL/L (ref 21–32)
CREAT SERPL-MCNC: 1.19 MG/DL (ref 0.6–1.3)
GFR SERPL CREATININE-BSD FRML MDRD: 62 ML/MIN/1.73SQ M
GLUCOSE P FAST SERPL-MCNC: 89 MG/DL (ref 65–99)
HDLC SERPL-MCNC: 54 MG/DL
LDLC SERPL CALC-MCNC: 74 MG/DL (ref 0–100)
POTASSIUM SERPL-SCNC: 4.4 MMOL/L (ref 3.5–5.3)
PROT SERPL-MCNC: 7 G/DL (ref 6.4–8.4)
SODIUM SERPL-SCNC: 137 MMOL/L (ref 135–147)
TRIGL SERPL-MCNC: 164 MG/DL

## 2023-08-16 PROCEDURE — 36415 COLL VENOUS BLD VENIPUNCTURE: CPT

## 2023-08-16 PROCEDURE — 80053 COMPREHEN METABOLIC PANEL: CPT

## 2023-08-16 PROCEDURE — 80061 LIPID PANEL: CPT

## 2023-08-22 ENCOUNTER — PATIENT MESSAGE (OUTPATIENT)
Dept: INTERNAL MEDICINE CLINIC | Facility: OTHER | Age: 68
End: 2023-08-22

## 2023-08-22 DIAGNOSIS — J45.20 MILD INTERMITTENT ASTHMA WITHOUT COMPLICATION: Primary | ICD-10-CM

## 2023-08-24 DIAGNOSIS — N20.0 RENAL CALCULI: ICD-10-CM

## 2023-08-24 RX ORDER — TAMSULOSIN HYDROCHLORIDE 0.4 MG/1
0.4 CAPSULE ORAL
Qty: 90 CAPSULE | Refills: 0 | Status: SHIPPED | OUTPATIENT
Start: 2023-08-24

## 2023-09-08 ENCOUNTER — TELEPHONE (OUTPATIENT)
Dept: INTERNAL MEDICINE CLINIC | Facility: OTHER | Age: 68
End: 2023-09-08

## 2023-09-08 DIAGNOSIS — J45.20 MILD INTERMITTENT ASTHMA WITHOUT COMPLICATION: Primary | ICD-10-CM

## 2023-09-08 RX ORDER — ALBUTEROL SULFATE 90 UG/1
2 AEROSOL, METERED RESPIRATORY (INHALATION) EVERY 6 HOURS PRN
Qty: 18 G | Refills: 5 | Status: SHIPPED | OUTPATIENT
Start: 2023-09-08

## 2023-09-08 NOTE — TELEPHONE ENCOUNTER
Received prior authorization request from pharmacy for the below medication:    Albuterol sulfate  mcg/act aerosol    Form scanned into patients chart

## 2023-09-13 ENCOUNTER — APPOINTMENT (OUTPATIENT)
Dept: RADIOLOGY | Age: 68
End: 2023-09-13
Payer: MEDICARE

## 2023-09-13 DIAGNOSIS — J45.20 MILD INTERMITTENT ASTHMA WITHOUT COMPLICATION: ICD-10-CM

## 2023-09-13 PROCEDURE — 71046 X-RAY EXAM CHEST 2 VIEWS: CPT

## 2023-09-15 ENCOUNTER — TELEPHONE (OUTPATIENT)
Dept: INTERNAL MEDICINE CLINIC | Age: 68
End: 2023-09-15

## 2023-09-15 NOTE — TELEPHONE ENCOUNTER
Received prior authorization for medication:    albuterol (ProAir HFA) 90 mcg/act inhaler         [facility] into media [facility]

## 2023-09-15 NOTE — TELEPHONE ENCOUNTER
Called patient again to clarify which medication is it that he normally uses and if he still has not gotten it.

## 2023-09-19 RX ORDER — ROSUVASTATIN CALCIUM 10 MG/1
10 TABLET, COATED ORAL DAILY
COMMUNITY
Start: 2023-08-21

## 2023-09-19 RX ORDER — EZETIMIBE 10 MG/1
10 TABLET ORAL DAILY
COMMUNITY
Start: 2023-08-21

## 2023-09-19 RX ORDER — ICOSAPENT ETHYL 1000 MG/1
1 CAPSULE ORAL 2 TIMES DAILY
COMMUNITY
Start: 2023-08-18

## 2023-09-19 RX ORDER — LOSARTAN POTASSIUM 50 MG/1
50 TABLET ORAL DAILY
COMMUNITY
Start: 2023-08-18

## 2023-09-19 NOTE — PROGRESS NOTES
Pulmonary Outpatient Consultation Note   Janki Section 75 y.o. male MRN: [id number]  9/19/2023    Referring provider: Krystin Nichols Do  [address] [facility],  [address] [place] [address]     Reason for Consultation: Suspected asthma    No problem-specific Assessment & Plan notes found for this encounter. Assessment:    1. Dyspnea on exertion, patient has been having progressive dyspnea since the spring 2021, he had a cardiac catheterization at that time and thought that would improve his symptoms but no significant relief. Apparently has a history of childhood asthma, exercise-induced asthma, in the past was on as needed epinephrine shots along with theophylline. He was evaluated at [facility] [place] in [place] where he had a methacholine challenge performed and was placed on medications that were coming from [facility], he was relatively stable for quite some time until spring 2021. CT chest with no significant ILD pattern, blood work with peripheral eosinophilia, PFT with mild air trapping. Can have small airway disease such as HP or asthma. 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 At this point would benefit from LAMA such as Incruse or Spiriva. I discussed possibly starting biologic therapy but patient feels that this is not asthma but he is willing to try LAMA therapy. If there is some improvement on LAMA therapy, then we can consider Nucala given his eosinophils. Again as the patient is not convinced that this is asthma but the rest of his work-up has been negative, we will also ask for a CPET. He states he feels short of breath after walking to the parking lot or up a flight of stairs. He feels like he has decent exercise tolerance. We also asked for pulmonary rehab but patient would like to hold off on that. 2.  Ex-smoker, quit 36 years ago, not eligible for low-dose lung cancer screening program as he quit more than 15 years ago  3. CAD status post stents 11/21  4. History of farm animal exposures  5. Hypertension on amlodipine and Bystolic  6. Hyperlipidemia on Crestor    Plan:    1. Start LAMA Incruse to optimize small airway disease therapies on top of the Advair 500  2. We discussed extensively about starting biologic agents but patient would like to hold off for now  3. I discussed referral to ENT for vocal cord dysfunction as some of his events are lasting only a few minutes but patient said when he went to [facility] [place] in the 90s they had a difficult time inserting nasal endoscopy so he would like to hold off on that  4. As there still is concern on what is underlying diagnoses, would send him for CPET to decide if this is deconditioning, pulmonary versus cardiac  5. Patient declined pulmonary rehab referral      History of Present Illness   HPI:    69-year-old gentleman with a past medical history of asthma on Advair 500, apparently patient was on lisinopril in the past and once he came off of it his shortness of breath improved. Review of primary care documentation from July patient uses Advair daily not twice daily, not on antihistamine or mast cell stabilizer, uses albuterol every morning. Patient states he has childhood asthma, never on a ventilator, used to have allergy infusions growing up, used to be on theophylline, was seen at [facility] [place] in [place] where they did a methacholine challenge. He said he was treated with medications that were not FDA approved that were coming from [facility]. Eventually he had clinical stability until spring 2021. At that time he had a heart catheterization and that would improve his dyspnea on exertion but to no significant relief. Recently primary care tried him on Trelegy for asthma but apparently that led to his leg swelling.     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 He denies any significant exposure since he was in for. He is an ex-smoker and stopped in 1978, he used to grow up around farm animals. PTA pulmonary meds include albuterol, Advair 500  Also taking amlodipine, Lipitor, losartan, nebivolol, Zetia    PFT September 2022 FEV1/FVC 69%, FEV1 59% predicted with positive bronchodilator response with mild air trapping  PFT February 2021 FEV1/FVC ratio 67%, FEV1 is 56 predicted, RV borderline 120%. Echocardiogram December 2021 60% EF, no pulmonary hypertension, normal RV, normal valves    Chest x-ray September 2023 left gastric bubble, normal-appearing lungs  CTA chest December 2021 essentially normal lung parenchyma  CT coronary August 2021 normal lungs    Absolute eosinophils 0.30, 0.24, 0.19  Aspergillus fumigatus negative  Allergy testing with several different allergens positivity    Review of Systems   Constitutional: Positive for activity change. HENT: Negative. Negative nasal polyps   Eyes: Negative. Respiratory: Positive for shortness of breath. Cardiovascular: Negative. Gastrointestinal: Negative. Endocrine: Negative. Genitourinary: Negative. Musculoskeletal: Negative. Allergic/Immunologic: Negative. Neurological: Negative. Hematological: Negative. Psychiatric/Behavioral: Negative.           Historical Information   Past Medical History:   Diagnosis Date   • Acute asthmatic bronchitis    • Acute frontal sinusitis    • Allergic    • Asthma    • History of acute pharyngitis    • History of allergy    • History of osteopenia    • History of right inguinal hernia    • History of seborrheic keratosis    • History of umbilical hernia    • Hypertension    • Preop examination    • Stage 3a chronic kidney disease ([address]) 9/19/2022     Past Surgical History:   Procedure Laterality Date   • APPENDECTOMY     • CARDIAC CATHETERIZATION Left 11/30/2021    Procedure: Cardiac Left Heart Cath;  Surgeon: Brianna Jones MD;  [facility]; Service: Cardiology   • CARDIAC CATHETERIZATION N/A 2021    Procedure: Cardiac pci;  Surgeon: Brianna Jones MD;  [facility];   Service: Cardiology   • INGUINAL HERNIA REPAIR     • UMBILICAL HERNIA REPAIR       Family History   Problem Relation Age of Onset   • Cancer Father         Adenoid Cystic Carcinoma   • Heart disease Father    • Heart disease Family    • Multiple myeloma Mother    • Heart disease Maternal Grandfather        Occupational History: Owns a printing shop    Social History     Tobacco Use   Smoking Status Former   • Packs/day: 0.50   • Years: 20.00   • Total pack years: 10.00   • Types: Cigarettes, Cigars   • Start date:    • Quit date: 1987   • Years since quittin.7   Smokeless Tobacco Never       Meds/Allergies     Current Outpatient Medications:   •  albuterol (2.5 mg/3 mL) 0.083 % nebulizer solution, Take 3 mL (2.5 mg total) by nebulization every 6 (six) hours as needed for wheezing or shortness of breath (Patient taking differently: Take 2.5 mg by nebulization if needed for wheezing or shortness of breath Hasn't needed but keeps it just incase), Disp: 100 mL, Rfl: 0  •  albuterol (ProAir HFA) 90 mcg/act inhaler, Inhale 2 puffs every 6 (six) hours as needed for wheezing, Disp: 18 g, Rfl: 5  •  amLODIPine (NORVASC) 10 mg tablet, Take 1 tablet (10 mg total) by mouth daily, Disp: 90 tablet, Rfl: 3  •  aspirin 81 mg chewable tablet, Chew 1 tablet (81 mg total) daily, Disp: 30 tablet, Rfl: 0  •  atorvastatin (LIPITOR) 40 mg tablet, Take 1 tablet by mouth once daily, Disp: 30 tablet, Rfl: 11  •  ezetimibe (ZETIA) 10 mg tablet, Take 10 mg by mouth daily, Disp: , Rfl:   •  Fluticasone-Salmeterol (Advair Diskus) 500-50 mcg/dose inhaler, Inhale 1 puff 2 (two) times a day Rinse mouth after use., Disp: 60 blister, Rfl: 5  •  Icosapent Ethyl 1 g CAPS, Take 1 capsule by mouth 2 (two) times a day, Disp: , Rfl:   •  losartan (COZAAR) 50 mg tablet, Take 50 mg by mouth daily, Disp: , Rfl:   •  nebivolol (BYSTOLIC) 2.5 mg tablet, Take 1 tablet (2.5 mg total) by mouth daily, Disp: 90 tablet, Rfl: 0  •  rosuvastatin (CRESTOR) 10 MG tablet, Take 10 mg by mouth daily, Disp: , Rfl:   •  tamsulosin (FLOMAX) 0.4 mg, Take 1 capsule (0.4 mg total) by mouth daily with dinner, Disp: 90 capsule, Rfl: 0  Allergies   Allergen Reactions   • Beta Adrenergic Blockers Other (See Comments)     asthma   • Erythromycin GI Intolerance   • Lisinopril Fatigue   • Pollen Extract    • Verapamil Other (See Comments)     Elevated hr    • Prednisone Anxiety     Oral tablets - depression       Vitals: There were no vitals taken for this visit. , There is no height or weight on file to calculate BMI. Physical Exam:    GEN: alert and oriented x 3, pleasant and cooperative   HEENT:  Normocephalic, atraumatic, anicteric  NECK: No JVD or carotid bruits   HEART: Rate, normal S1 and S2  LUNGS: Clear to auscultation bilaterally; no wheezes, rales, or rhonchi; respiration nonlabored   ABDOMEN:  Normoactive bowel sounds, soft, no tenderness, no distention  EXTREMITIES: peripheral pulses palpable; no edema  NEURO: no gross focal findings; cranial nerves grossly intact   SKIN:  Dry, intact, warm to touch    Labs: I have personally reviewed pertinent lab results.   Lab Results   Component Value Date    IGE 1,145 (H) 01/18/2022       Imaging and other studies: I have personally reviewed pertinent films in PACS  CT chest essentially negative  CT coronary with normal lungs    Pulmonary function testing:  Personally interpreted by me  Reviewed PFTs, mild air trapping with RV    Other Studies: I have personally reviewed pertinent films in PACS  Reviewed previous echocardiograms, no significant pulmonary hypertension     Tommy Terrell MD  Pulmonary and [address]

## 2023-09-20 ENCOUNTER — CONSULT (OUTPATIENT)
Dept: PULMONOLOGY | Facility: CLINIC | Age: 68
End: 2023-09-20
Payer: MEDICARE

## 2023-09-20 VITALS
HEART RATE: 85 BPM | SYSTOLIC BLOOD PRESSURE: 150 MMHG | TEMPERATURE: 98.9 F | OXYGEN SATURATION: 98 % | RESPIRATION RATE: 18 BRPM | DIASTOLIC BLOOD PRESSURE: 80 MMHG | HEIGHT: 68 IN | WEIGHT: 184 LBS | BODY MASS INDEX: 27.89 KG/M2

## 2023-09-20 DIAGNOSIS — J45.20 MILD INTERMITTENT ASTHMA WITHOUT COMPLICATION: ICD-10-CM

## 2023-09-20 DIAGNOSIS — Z87.891 EX-SMOKER: ICD-10-CM

## 2023-09-20 DIAGNOSIS — J82.83 EOSINOPHILIC ASTHMA: Primary | ICD-10-CM

## 2023-09-20 DIAGNOSIS — R06.09 DOE (DYSPNEA ON EXERTION): ICD-10-CM

## 2023-09-20 PROCEDURE — 99205 OFFICE O/P NEW HI 60 MIN: CPT | Performed by: STUDENT IN AN ORGANIZED HEALTH CARE EDUCATION/TRAINING PROGRAM

## 2023-09-20 RX ORDER — TIOTROPIUM BROMIDE INHALATION SPRAY 3.12 UG/1
2 SPRAY, METERED RESPIRATORY (INHALATION) DAILY
Qty: 4 G | Refills: 2 | Status: SHIPPED | OUTPATIENT
Start: 2023-09-20 | End: 2023-10-20

## 2023-09-20 NOTE — PATIENT INSTRUCTIONS
It was a pleasure seeing you today!     Obtain CPET  Start Spiriva   Consider Biologics   See me 3 months     Roni Engel MD  Pulmonary and Critical Care Medicine

## 2023-09-26 ENCOUNTER — RA CDI HCC (OUTPATIENT)
Dept: OTHER | Facility: HOSPITAL | Age: 68
End: 2023-09-26

## 2023-10-03 ENCOUNTER — OFFICE VISIT (OUTPATIENT)
Dept: INTERNAL MEDICINE CLINIC | Facility: OTHER | Age: 68
End: 2023-10-03
Payer: MEDICARE

## 2023-10-03 VITALS
BODY MASS INDEX: 27.71 KG/M2 | SYSTOLIC BLOOD PRESSURE: 134 MMHG | OXYGEN SATURATION: 98 % | TEMPERATURE: 98.8 F | HEART RATE: 81 BPM | WEIGHT: 182.8 LBS | DIASTOLIC BLOOD PRESSURE: 74 MMHG | HEIGHT: 68 IN

## 2023-10-03 DIAGNOSIS — Z12.11 ENCOUNTER FOR COLORECTAL CANCER SCREENING: ICD-10-CM

## 2023-10-03 DIAGNOSIS — E78.2 MIXED HYPERLIPIDEMIA: ICD-10-CM

## 2023-10-03 DIAGNOSIS — R06.09 DOE (DYSPNEA ON EXERTION): ICD-10-CM

## 2023-10-03 DIAGNOSIS — Z23 NEEDS FLU SHOT: ICD-10-CM

## 2023-10-03 DIAGNOSIS — I10 ESSENTIAL HYPERTENSION: Primary | ICD-10-CM

## 2023-10-03 DIAGNOSIS — I25.10 CORONARY ARTERY DISEASE INVOLVING NATIVE CORONARY ARTERY OF NATIVE HEART WITHOUT ANGINA PECTORIS: ICD-10-CM

## 2023-10-03 DIAGNOSIS — Z00.00 ENCOUNTER FOR ANNUAL WELLNESS VISIT (AWV) IN MEDICARE PATIENT: ICD-10-CM

## 2023-10-03 DIAGNOSIS — J45.20 MILD INTERMITTENT ASTHMA WITHOUT COMPLICATION: ICD-10-CM

## 2023-10-03 DIAGNOSIS — Z12.12 ENCOUNTER FOR COLORECTAL CANCER SCREENING: ICD-10-CM

## 2023-10-03 PROCEDURE — 99214 OFFICE O/P EST MOD 30 MIN: CPT | Performed by: FAMILY MEDICINE

## 2023-10-03 PROCEDURE — G0439 PPPS, SUBSEQ VISIT: HCPCS | Performed by: FAMILY MEDICINE

## 2023-10-03 PROCEDURE — 90662 IIV NO PRSV INCREASED AG IM: CPT

## 2023-10-03 PROCEDURE — G0008 ADMIN INFLUENZA VIRUS VAC: HCPCS

## 2023-10-03 NOTE — PROGRESS NOTES
Assessment/Plan:    1. Encounter for colorectal cancer screening  -     Cologuard    2. Needs flu shot  -     influenza vaccine, high-dose, PF 0.7 mL (FLUZONE HIGH-DOSE)    3. Essential hypertension    4. Coronary artery disease involving native coronary artery of native heart without angina pectoris    5. LAFLEUR (dyspnea on exertion)    6. Mild intermittent asthma without complication    7. Mixed hyperlipidemia    8. Encounter for annual wellness visit (AWV) in Medicare patient            There are no Patient Instructions on file for this visit. Return in about 6 months (around 4/3/2024) for Recheck. Subjective:      Patient ID: Marissa Harada is a 75 y.o. male. Chief Complaint   Patient presents with   • Follow-up     Patient here for 6 month follow-up    • Medicare Wellness Visit   • Results     Labs done 8/16/2023   • Shortness of Breath   • Immunizations       HD flu       HPI    Answers for HPI/ROS submitted by the patient on 9/26/2023  How would you rate your overall health?: good  Compared to last year, how is your physical health?: same  In general, how satisfied are you with your life?: satisfied  Compared to last year, how is your eyesight?: same  Compared to last year, how is your hearing?: same  Compared to last year, how is your emotional/mental health?: same  How often is anger a problem for you?: never, rarely  How often do you feel unusually tired/fatigued?: sometimes  In the past 7 days, how much pain have you experienced?: some  If you answered "some" or "a lot", please rate the severity of your pain on a scale of 1 to 10 (1 being the least severe pain and 10 being the most intense pain). : 6/10  In the past 6 months, have you lost or gained 10 pounds without trying?: No  One or more falls in the last year: No  Do you have trouble with the stairs inside or outside your home?: No  Does your home have working smoke alarms?: Yes  Does your home have a carbon monoxide monitor?: Yes  Which safety hazards (if any) have you experienced in your home? Please select all that apply.: none  How would you describe your current diet?  Please select all that apply.: Regular  In addition to prescription medications, are you taking any over-the-counter supplements?: No  Can you manage your medications?: Yes  Are you currently taking any opioid medications?: No  Can you walk and transfer into and out of your bed and chair?: Yes  Can you dress and groom yourself?: Yes  Can you bathe or shower yourself?: Yes  Can you feed yourself?: Yes  Can you do your laundry/ housekeeping?: Yes  Can you manage your money, pay your bills, and track your expenses?: Yes  Can you make your own meals?: Yes  Can you do your own shopping?: Yes  Within the last 12 months, have you had any hospitalizations or Emergency Department visits?: No  Do you have a living will?: Yes  Do you have a Durable POA (Power of ) for healthcare decisions?: Yes  Do you have an Advanced Directive for end of life decisions?: Yes  How often have you used an illegal drug (including marijuana) or a prescription medication for non-medical reasons in the past year?: never  What is the typical number of drinks you consume in a day?: 1  What is the typical number of drinks you consume in a week?: 5  How often did you have a drink containing alcohol in the past year?: 2 to 3 times a week  How many drinks did you have on a typical day  when you were drinking in the past year?: 1 to 2  How often did you have 6 or more drinks on one occasion in the past year?: never          The following portions of the patient's history were reviewed and updated as appropriate: allergies, current medications, past family history, past medical history, past social history, past surgical history and problem list.    Review of Systems        Constitutional:  Denies fever or chills   Eyes:  Denies double , blurry vision or eye pain  HENT:  Denies nasal congestion, sore throat or new hearing issues  Respiratory:  Denies cough or shortness of breath or wheezing  Cardiovascular:  Denies palpitations or chest pain  GI:  Denies abdominal pain, nausea, or vomiting, no loose stools, no reflux  Integument:  Denies rash , no open areas  Neurologic:  Denies headache or focal weakness, no dizziness  : no dysuria, or hematuria      Current Outpatient Medications   Medication Sig Dispense Refill   • albuterol (ProAir HFA) 90 mcg/act inhaler Inhale 2 puffs every 6 (six) hours as needed for wheezing 18 g 5   • amLODIPine (NORVASC) 10 mg tablet Take 1 tablet (10 mg total) by mouth daily 90 tablet 3   • aspirin 81 mg chewable tablet Chew 1 tablet (81 mg total) daily 30 tablet 0   • ezetimibe (ZETIA) 10 mg tablet Take 10 mg by mouth daily     • Fluticasone-Salmeterol (Advair Diskus) 500-50 mcg/dose inhaler Inhale 1 puff 2 (two) times a day Rinse mouth after use. 60 blister 5   • Icosapent Ethyl 1 g CAPS Take 1 capsule by mouth 2 (two) times a day     • nebivolol (BYSTOLIC) 2.5 mg tablet Take 1 tablet (2.5 mg total) by mouth daily 90 tablet 0   • rosuvastatin (CRESTOR) 10 MG tablet Take 10 mg by mouth daily     • tamsulosin (FLOMAX) 0.4 mg Take 1 capsule (0.4 mg total) by mouth daily with dinner 90 capsule 0   • albuterol (2.5 mg/3 mL) 0.083 % nebulizer solution Take 3 mL (2.5 mg total) by nebulization every 6 (six) hours as needed for wheezing or shortness of breath (Patient not taking: Reported on 9/20/2023) 100 mL 0   • atorvastatin (LIPITOR) 40 mg tablet Take 1 tablet by mouth once daily (Patient not taking: Reported on 9/20/2023) 30 tablet 11   • tiotropium (Spiriva Respimat) 2.5 MCG/ACT AERS inhaler Inhale 2 puffs daily 4 g 2     No current facility-administered medications for this visit.        Objective:    /74 (BP Location: Left arm, Patient Position: Sitting, Cuff Size: Standard)   Pulse 81   Temp 98.8 °F (37.1 °C) (Temporal)   Ht 5' 8" (1.727 m)   Wt 82.9 kg (182 lb 12.8 oz)   SpO2 98% BMI 27.79 kg/m²        Physical Exam      Constitutional:  Well developed, well nourished, no acute distress, non-toxic appearance   Eyes:  PERRL, conjunctiva normal , non icteric sclera  HENT:  Atraumatic, oropharynx moist. Neck-  supple   Respiratory:  CTA b/l, normal breath sounds, no rales, no wheezing   Cardiovascular:  RRR, no murmurs, no LE edema b/l  GI:  Soft, nondistended, normal bowel sounds x 4, nontender, no organomegaly, no mass, no rebound, no guarding   Neurologic:  no focal deficits noted   Psychiatric:  Speech and behavior appropriate , AAO x 3           Justina Chapo, DO

## 2023-10-03 NOTE — PROGRESS NOTES
Liana Harden is here for his Subsequent Wellness visit. Last Medicare Wellness visit information reviewed, patient interviewed and updates made to the record today. Health Risk Assessment:   Patient rates overall health as good. Patient feels that their physical health rating is same. Patient is satisfied with their life. Eyesight was rated as same. Hearing was rated as same. Patient feels that their emotional and mental health rating is same. Patients states they are never, rarely angry. Patient states they are sometimes unusually tired/fatigued. Pain experienced in the last 7 days has been some. Patient's pain rating has been 6/10. Patient states that he has experienced no weight loss or gain in last 6 months. Depression Screening:   PHQ-2 Score: 0      Fall Risk Screening: In the past year, patient has experienced: no history of falling in past year      Home Safety:  Patient does not have trouble with stairs inside or outside of their home. Patient has working smoke alarms and has working carbon monoxide detector. Home safety hazards include: none. Nutrition:   Current diet is Regular. Medications:   Patient is not currently taking any over-the-counter supplements. Patient is able to manage medications. Activities of Daily Living (ADLs)/Instrumental Activities of Daily Living (IADLs):   Walk and transfer into and out of bed and chair?: Yes  Dress and groom yourself?: Yes    Bathe or shower yourself?: Yes    Feed yourself? Yes  Do your laundry/housekeeping?: Yes  Manage your money, pay your bills and track your expenses?: Yes  Make your own meals?: Yes    Do your own shopping?: Yes    Previous Hospitalizations:   Any hospitalizations or ED visits within the last 12 months?: No      Advance Care Planning:   Living will: Yes    Durable POA for healthcare:  Yes    Advanced directive: Yes      Comments: His wife Ayden Rose    Cognitive Screening:   Provider or family/friend/caregiver concerned regarding cognition?: No    PREVENTIVE SCREENINGS      Cardiovascular Screening:    General: Screening Not Indicated and History Lipid Disorder      Diabetes Screening:     General: Screening Current      Prostate Cancer Screening:    General: Screening Current      Abdominal Aortic Aneurysm (AAA) Screening:    Risk factors include: age between 70-75 yo and tobacco use        Lung Cancer Screening:     General: Screening Not Indicated      Hepatitis C Screening:    General: Screening Current    Screening, Brief Intervention, and Referral to Treatment (SBIRT)    Screening  Typical number of drinks in a day: 1  Typical number of drinks in a week: 5  Interpretation: Low risk drinking behavior. AUDIT-C Screenin) How often did you have a drink containing alcohol in the past year? 2 to 3 times a week  2) How many drinks did you have on a typical day when you were drinking in the past year? 1 to 2  3) How often did you have 6 or more drinks on one occasion in the past year? never    AUDIT-C Score: 3  Interpretation: Score 0-3 (male): Negative screen for alcohol misuse    Single Item Drug Screening:  How often have you used an illegal drug (including marijuana) or a prescription medication for non-medical reasons in the past year? never    Single Item Drug Screen Score: 0  Interpretation: Negative screen for possible drug use disorder    Brief Intervention  Alcohol & drug use screenings were reviewed. No concerns regarding substance use disorder identified. Other Counseling Topics:   Car/seat belt/driving safety, skin self-exam, sunscreen and regular weightbearing exercise and calcium and vitamin D intake.

## 2023-10-19 DIAGNOSIS — I10 ESSENTIAL HYPERTENSION: ICD-10-CM

## 2023-10-19 RX ORDER — NEBIVOLOL 2.5 MG/1
2.5 TABLET ORAL DAILY
Qty: 90 TABLET | Refills: 0 | Status: SHIPPED | OUTPATIENT
Start: 2023-10-19

## 2023-11-03 DIAGNOSIS — I10 ESSENTIAL HYPERTENSION: ICD-10-CM

## 2023-11-03 DIAGNOSIS — N20.0 RENAL CALCULI: ICD-10-CM

## 2023-11-03 RX ORDER — AMLODIPINE BESYLATE 10 MG/1
10 TABLET ORAL DAILY
Qty: 90 TABLET | Refills: 0 | Status: SHIPPED | OUTPATIENT
Start: 2023-11-03

## 2023-11-03 RX ORDER — TAMSULOSIN HYDROCHLORIDE 0.4 MG/1
0.4 CAPSULE ORAL
Qty: 90 CAPSULE | Refills: 0 | Status: SHIPPED | OUTPATIENT
Start: 2023-11-03

## 2023-11-29 LAB — COLOGUARD RESULT REPORTABLE: NEGATIVE

## 2023-12-02 PROBLEM — Z00.00 ENCOUNTER FOR ANNUAL WELLNESS VISIT (AWV) IN MEDICARE PATIENT: Status: RESOLVED | Noted: 2023-10-03 | Resolved: 2023-12-02

## 2023-12-22 ENCOUNTER — TELEPHONE (OUTPATIENT)
Dept: PULMONOLOGY | Facility: CLINIC | Age: 68
End: 2023-12-22

## 2024-02-06 ENCOUNTER — APPOINTMENT (OUTPATIENT)
Dept: LAB | Age: 69
End: 2024-02-06
Payer: MEDICARE

## 2024-02-06 DIAGNOSIS — Z79.899 ENCOUNTER FOR LONG-TERM (CURRENT) USE OF OTHER MEDICATIONS: ICD-10-CM

## 2024-02-06 DIAGNOSIS — I10 ESSENTIAL HYPERTENSION, MALIGNANT: ICD-10-CM

## 2024-02-06 DIAGNOSIS — E78.5 HYPERLIPIDEMIA, UNSPECIFIED HYPERLIPIDEMIA TYPE: ICD-10-CM

## 2024-02-06 DIAGNOSIS — I25.10 ATHEROSCLEROSIS OF NATIVE CORONARY ARTERY OF NATIVE HEART WITHOUT ANGINA PECTORIS: ICD-10-CM

## 2024-02-06 LAB
ALBUMIN SERPL BCP-MCNC: 4.3 G/DL (ref 3.5–5)
ALP SERPL-CCNC: 52 U/L (ref 34–104)
ALT SERPL W P-5'-P-CCNC: 26 U/L (ref 7–52)
ANION GAP SERPL CALCULATED.3IONS-SCNC: 11 MMOL/L
AST SERPL W P-5'-P-CCNC: 20 U/L (ref 13–39)
BILIRUB DIRECT SERPL-MCNC: 0.1 MG/DL (ref 0–0.2)
BILIRUB SERPL-MCNC: 0.48 MG/DL (ref 0.2–1)
BUN SERPL-MCNC: 19 MG/DL (ref 5–25)
CALCIUM SERPL-MCNC: 9.6 MG/DL (ref 8.4–10.2)
CHLORIDE SERPL-SCNC: 103 MMOL/L (ref 96–108)
CHOLEST SERPL-MCNC: 156 MG/DL
CK SERPL-CCNC: 118 U/L (ref 39–308)
CO2 SERPL-SCNC: 27 MMOL/L (ref 21–32)
CREAT SERPL-MCNC: 0.99 MG/DL (ref 0.6–1.3)
EST. AVERAGE GLUCOSE BLD GHB EST-MCNC: 114 MG/DL
GFR SERPL CREATININE-BSD FRML MDRD: 77 ML/MIN/1.73SQ M
GLUCOSE P FAST SERPL-MCNC: 89 MG/DL (ref 65–99)
HBA1C MFR BLD: 5.6 %
HDLC SERPL-MCNC: 63 MG/DL
LDLC SERPL CALC-MCNC: 74 MG/DL (ref 0–100)
MAGNESIUM SERPL-MCNC: 1.8 MG/DL (ref 1.9–2.7)
NONHDLC SERPL-MCNC: 93 MG/DL
POTASSIUM SERPL-SCNC: 4.4 MMOL/L (ref 3.5–5.3)
PROT SERPL-MCNC: 6.4 G/DL (ref 6.4–8.4)
SODIUM SERPL-SCNC: 141 MMOL/L (ref 135–147)
TRIGL SERPL-MCNC: 95 MG/DL

## 2024-02-06 PROCEDURE — 80061 LIPID PANEL: CPT

## 2024-02-06 PROCEDURE — 36415 COLL VENOUS BLD VENIPUNCTURE: CPT

## 2024-02-06 PROCEDURE — 83036 HEMOGLOBIN GLYCOSYLATED A1C: CPT

## 2024-02-06 PROCEDURE — 83735 ASSAY OF MAGNESIUM: CPT

## 2024-02-06 PROCEDURE — 82550 ASSAY OF CK (CPK): CPT

## 2024-02-06 PROCEDURE — 80076 HEPATIC FUNCTION PANEL: CPT

## 2024-02-06 PROCEDURE — 80048 BASIC METABOLIC PNL TOTAL CA: CPT

## 2024-02-23 DIAGNOSIS — I10 ESSENTIAL HYPERTENSION: ICD-10-CM

## 2024-02-23 RX ORDER — NEBIVOLOL 2.5 MG/1
2.5 TABLET ORAL DAILY
Qty: 90 TABLET | Refills: 1 | Status: SHIPPED | OUTPATIENT
Start: 2024-02-23

## 2024-02-23 RX ORDER — AMLODIPINE BESYLATE 10 MG/1
10 TABLET ORAL DAILY
Qty: 90 TABLET | Refills: 0 | Status: SHIPPED | OUTPATIENT
Start: 2024-02-23

## 2024-03-04 DIAGNOSIS — J45.20 MILD INTERMITTENT ASTHMA WITHOUT COMPLICATION: ICD-10-CM

## 2024-03-04 RX ORDER — FLUTICASONE PROPIONATE AND SALMETEROL 500; 50 UG/1; UG/1
1 POWDER RESPIRATORY (INHALATION) 2 TIMES DAILY
Qty: 60 BLISTER | Refills: 0 | Status: SHIPPED | OUTPATIENT
Start: 2024-03-04 | End: 2024-04-03

## 2024-04-01 DIAGNOSIS — E78.2 MIXED HYPERLIPIDEMIA: Primary | ICD-10-CM

## 2024-04-01 DIAGNOSIS — J45.20 MILD INTERMITTENT ASTHMA WITHOUT COMPLICATION: ICD-10-CM

## 2024-04-01 DIAGNOSIS — N20.0 RENAL CALCULI: ICD-10-CM

## 2024-04-01 RX ORDER — ROSUVASTATIN CALCIUM 10 MG/1
10 TABLET, COATED ORAL DAILY
Qty: 90 TABLET | Refills: 1 | Status: SHIPPED | OUTPATIENT
Start: 2024-04-01

## 2024-04-01 RX ORDER — TAMSULOSIN HYDROCHLORIDE 0.4 MG/1
0.4 CAPSULE ORAL
Qty: 90 CAPSULE | Refills: 1 | Status: SHIPPED | OUTPATIENT
Start: 2024-04-01

## 2024-04-01 RX ORDER — FLUTICASONE PROPIONATE AND SALMETEROL 500; 50 UG/1; UG/1
1 POWDER RESPIRATORY (INHALATION) 2 TIMES DAILY
Qty: 60 BLISTER | Refills: 1 | Status: SHIPPED | OUTPATIENT
Start: 2024-04-01 | End: 2024-05-31

## 2024-04-01 RX ORDER — ALBUTEROL SULFATE 90 UG/1
2 AEROSOL, METERED RESPIRATORY (INHALATION) EVERY 6 HOURS PRN
Qty: 18 G | Refills: 1 | Status: SHIPPED | OUTPATIENT
Start: 2024-04-01

## 2024-04-11 ENCOUNTER — OFFICE VISIT (OUTPATIENT)
Dept: INTERNAL MEDICINE CLINIC | Age: 69
End: 2024-04-11
Payer: MEDICARE

## 2024-04-11 VITALS
BODY MASS INDEX: 27.46 KG/M2 | HEIGHT: 68 IN | SYSTOLIC BLOOD PRESSURE: 150 MMHG | TEMPERATURE: 98.7 F | OXYGEN SATURATION: 96 % | WEIGHT: 181.2 LBS | HEART RATE: 86 BPM | DIASTOLIC BLOOD PRESSURE: 70 MMHG

## 2024-04-11 DIAGNOSIS — M25.512 CHRONIC LEFT SHOULDER PAIN: Primary | ICD-10-CM

## 2024-04-11 DIAGNOSIS — I10 ESSENTIAL HYPERTENSION: ICD-10-CM

## 2024-04-11 DIAGNOSIS — G89.29 CHRONIC LEFT SHOULDER PAIN: Primary | ICD-10-CM

## 2024-04-11 DIAGNOSIS — J45.20 MILD INTERMITTENT ASTHMA WITHOUT COMPLICATION: ICD-10-CM

## 2024-04-11 DIAGNOSIS — Z95.5 STATUS POST INSERTION OF DRUG ELUTING CORONARY ARTERY STENT: ICD-10-CM

## 2024-04-11 DIAGNOSIS — R19.8 INCREASED ABDOMINAL GIRTH: ICD-10-CM

## 2024-04-11 DIAGNOSIS — E78.2 MIXED HYPERLIPIDEMIA: ICD-10-CM

## 2024-04-11 DIAGNOSIS — I25.10 CORONARY ARTERY DISEASE INVOLVING NATIVE CORONARY ARTERY OF NATIVE HEART WITHOUT ANGINA PECTORIS: ICD-10-CM

## 2024-04-11 DIAGNOSIS — R06.09 DOE (DYSPNEA ON EXERTION): ICD-10-CM

## 2024-04-11 DIAGNOSIS — Z82.49 FAMILY HISTORY OF EARLY CAD: ICD-10-CM

## 2024-04-11 DIAGNOSIS — R06.83 SNORING: ICD-10-CM

## 2024-04-11 DIAGNOSIS — R73.09 ABNORMAL GLUCOSE: ICD-10-CM

## 2024-04-11 DIAGNOSIS — Z12.5 SCREENING PSA (PROSTATE SPECIFIC ANTIGEN): ICD-10-CM

## 2024-04-11 PROCEDURE — G2211 COMPLEX E/M VISIT ADD ON: HCPCS | Performed by: FAMILY MEDICINE

## 2024-04-11 PROCEDURE — 99214 OFFICE O/P EST MOD 30 MIN: CPT | Performed by: FAMILY MEDICINE

## 2024-04-11 RX ORDER — LOSARTAN POTASSIUM 50 MG/1
50 TABLET ORAL DAILY
COMMUNITY

## 2024-04-11 NOTE — PROGRESS NOTES
Assessment/Plan:    1. Chronic left shoulder pain  -     XR shoulder 2+ vw left; Future; Expected date: 04/11/2024    2. Essential hypertension  -     Comprehensive metabolic panel; Future  -     CBC and differential; Future    3. Screening PSA (prostate specific antigen)  -     PSA, Total Screen; Future    4. Mixed hyperlipidemia  -     Lipid Panel with Direct LDL reflex; Future    5. LAFLEUR (dyspnea on exertion)  -     Ambulatory Referral to Allergy; Future  -     Ambulatory Referral to Otolaryngology; Future    6. Mild intermittent asthma without complication  -     Ambulatory Referral to Allergy; Future  -     Ambulatory Referral to Otolaryngology; Future    7. Abnormal glucose  -     Hemoglobin A1C; Future    8. Coronary artery disease involving native coronary artery of native heart without angina pectoris    9. Family history of early CAD    10. Status post insertion of drug eluting coronary artery stent    11. Increased abdominal girth  -     CT abdomen pelvis w contrast; Future; Expected date: 04/11/2024    12. BMI 27.0-27.9,adult  -     CT abdomen pelvis w contrast; Future; Expected date: 04/11/2024    13. Snoring  -     Ambulatory Referral to Sleep Medicine; Future    Check CT scan abdomen and pelvis as patient has not been able to lose weight and his increased abdominal girth and early satiety have created problems with his breathing.  All lung and cardiac testing has been normal and he is compliant with medications.  He has not had a sleep study or followed up with ENT yet as he could be having some upper airway issues however he cannot bend down and tie his shoes due to being short of breath as he feels like his abdomen is pushing on his diaphragm.  We have not evaluated him from a GI standpoint.  His last colonoscopy was many years ago as he had difficulty at that time however is compliant with Cologuard's which are negative.          BMI counseling: the BMI is above normal. Nutrition recommendations  include reducing portion sizes and increasing intake of lean protein. Exercise recommendations include moderate aerobic physical activity for 150 minutes/week.    There are no Patient Instructions on file for this visit.    Return in about 6 months (around 10/11/2024).    Subjective:      Patient ID: Harinder Ventura is a 68 y.o. male.    Chief Complaint   Patient presents with    Follow-up     Labs 02/2024, no refills       HPI    Shortness of breath, patient was taken off lisinopril and feels that his shortness of breath has significantly improved.  Had repeat PFT which is in his chart and essentially unchanged from previous with moderate obstructive disease.  He was increased for from 250 mg Advair to 500 but notices no improvement with that.  He would like to go back to 250 mg.  He saw Cardiology who increased his amlodipine however his blood pressure still remains high.  Patient denies any chest pain or shortness of breath.  March 2023, no significant improvement, no weight gain since last visit but over the 2 years has put on about 6 or 7 pounds which are central, he feels that this is affecting his breathing especially when he bends down due to asthma  July 2023, still feels that he is not any better and has decreased his daily workload due to the  April 2024: stable but not at baseline. Still has LAFLEUR, has had several medications and several tests without any relief.  Has difficulty bending down and tying his shoes as he gets short of breath.  Has difficulty on the treadmill as he gets short of breath.  Has completed all testing except for any abdominal testing that has not been ordered and he has not had a sleep study done he does snore at night.  Patient is following up with cardiology and has multiple testing done again since a few years ago.  He feels disappointed as he has not been as active or been able to do the things that he could before and he is dealing with a large abdominal area         Asthma,  intermittent, has some wheezing more so from his throat on a regular basis.  Only uses Advair once a day even though he knows he is supposed to do it twice a day.  Not on antihistamine or mast cell stabilizer.  Uses albuterol inhaler every single morning once a day.  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  He does not have baseline shortness of breath.  July 2021, had PFTs done in January which showed FEV1 of 56%.  At 1 point he has severe asthma and was on Advair 500 mg and currently is on 250. His FEV1 to use to be 46% according to him.  His last PFT was done many many years ago.  No recent exacerbations requiring ER visits hospitalizations or prednisone  Sept 2021: stable, no exacerbations, has had LAFLEUR with heavy lifting in the yard  September 2022, has been having dyspnea on exertion and decreased endurance this year.  Was seen in December for similar symptoms and workup was negative.  Since that time patient states he has not gotten any better and he cannot go up 1-2 flights of steps without stopping to catch his breath which is completely abnormal for him.  He had a drug-eluting stent placed but did not have his summer follow-up due to not being able to contact the office.  He feels his difficulties are from lisinopril and Brilinta and he would like both changed.  Patient states he did stop them for 2 days until better.  He states his abdomen has been getting larger without any change in weight.  He denies constipation nausea vomiting or bloating.  He is not having any wheezing at home and at rest he has no shortness of breath.  He has not needed his rescue inhaler but and he is compliant with his medications.  March 2023, no recent exacerbations  July 2023, feels that he is not any better overall, following with cardiology and is not able to do his heavy workload and physical activity around the house as previous   April 2024: stable but not  at baseline. Still has LAFLEUR, has had several medications and several tests without any relief        Essential hypertension, on amlodipine, increase most recently by Cardiology.  Blood pressure readings are significantly elevated.  He feels that Brilinta may be contributing since he usually has some strange side effects however he only has 6 weeks left until completion of his full course and then he will continue on aspirin.  Patient would like to revisit metoprolol since originally with all his fatigue was from that however since stopping lisinopril his fatigue has largely improved.   march 2023: Improved since seeing cardiology, was placed on verapamil but after 3 days had side effects.  Taken off metoprolol, now started on Bystolic which has been working well and he is tolerating it.  Home blood pressure readings are very well controlled  April 2024, Bystolic removed by cardiology due to history of asthma however it was very well helping with his blood pressure.  Patient now on amlodipine and losartan which controlled his systolic to the 140s at home.  He is not happy with his numbers as previously was better.  He is following regularly with cardiology and has had cardiac testing done    Hyperlipidemia, currently not on medications.  His cholesterol does fluctuate from time to time.  He does have family history but he eats very well and needs approximately 5-6 very small meals throughout the day on a regular basis.  July 2021, last LDL remains elevated.  Patient not on statin and did not get his stress test as recommended.  He was concerned with the dobutamine and is asthma.  He is requesting alternative testing.  He has significant early family history in his father who had 3 acute MI eyes and then underwent chelation therapy which was beneficial to him.  Patient occasionally has intermittent chest discomfort but nothing that lasts or makes him worried to go to the emergency room.  He is very active and works and  is yard   Sept 2021: had labs done, improved lipids, not on meds, Ct calcium score Is resulted he is here for results  September 2022, last lab work done in December showed normal levels.  Due for lab work now.    April 2024, normal levels, on low-dose statin per cardiology.  Feels fatigue with taking the medications but he is compliant      Drug-eluting stent placed less than 1 year ago.  Was on Plavix it was making him tired with metoprolol and both were changed to Brilinta and lisinopril.  He feels that he is not any better and his dyspnea on exertion and fatigue and lack of endurance or due to these medications.  He did not have a 6 month follow-up scheduled by Cardiology and now it is at 9 months.  April 2024, follows with cardiology and had multiple testing done which was normal.  Now on Crestor with normal lipid levels however he feels he is having side effects of Crestor.          The following portions of the patient's history were reviewed and updated as appropriate: allergies, current medications, past family history, past medical history, past social history, past surgical history and problem list.    Review of Systems        Constitutional:  Denies fever or chills   Eyes:  Denies double , blurry vision or eye pain  HENT:  Denies nasal congestion, sore throat or new hearing issues  Respiratory:  Denies cough or shortness of breath or wheezing  Cardiovascular:  Denies palpitations or chest pain  GI:  Denies abdominal pain, nausea, or vomiting, no loose stools, no reflux  Integument:  Denies rash , no open areas  Neurologic:  Denies headache or focal weakness, no dizziness  : no dysuria, or hematuria      Current Outpatient Medications   Medication Sig Dispense Refill    albuterol (ProAir HFA) 90 mcg/act inhaler Inhale 2 puffs every 6 (six) hours as needed for wheezing 18 g 1    amLODIPine (NORVASC) 10 mg tablet Take 1 tablet (10 mg total) by mouth daily 90 tablet 0    aspirin 81 mg chewable tablet Chew  "1 tablet (81 mg total) daily 30 tablet 0    ezetimibe (ZETIA) 10 mg tablet Take 10 mg by mouth daily      Fluticasone-Salmeterol (Advair Diskus) 500-50 mcg/dose inhaler Inhale 1 puff 2 (two) times a day Rinse mouth after use. 60 blister 1    losartan (COZAAR) 50 mg tablet Take 50 mg by mouth daily      rosuvastatin (CRESTOR) 10 MG tablet Take 1 tablet (10 mg total) by mouth daily 90 tablet 1    tamsulosin (FLOMAX) 0.4 mg Take 1 capsule (0.4 mg total) by mouth daily with dinner 90 capsule 1    albuterol (2.5 mg/3 mL) 0.083 % nebulizer solution Take 3 mL (2.5 mg total) by nebulization every 6 (six) hours as needed for wheezing or shortness of breath (Patient not taking: Reported on 9/20/2023) 100 mL 0     No current facility-administered medications for this visit.       Objective:    /70 (BP Location: Left arm, Patient Position: Sitting, Cuff Size: Adult)   Pulse 86   Temp 98.7 °F (37.1 °C) (Temporal)   Ht 5' 8\" (1.727 m)   Wt 82.2 kg (181 lb 3.2 oz)   SpO2 96%   BMI 27.55 kg/m²        Physical Exam       Constitutional:  Well developed, well nourished, no acute distress, non-toxic appearance , upper airway transmitted wheezing sounds  Eyes:  PERRL, conjunctiva normal , non icteric sclera  HENT:  Atraumatic, oropharynx moist. Neck-  supple   Respiratory:  CTA b/l, normal breath sounds, no rales, no wheezing , decreased breath sounds at bases due to breathing effort which is short and quick  Cardiovascular:  RRR, no murmurs, no LE edema b/l  GI:  Soft, nondistended, normal bowel sounds x 4, nontender, no organomegaly, no mass, no rebound, no guarding   Neurologic:  no focal deficits noted   Psychiatric:  Speech and behavior appropriate , AAO x 3      Noelle Early DO  "

## 2024-04-15 ENCOUNTER — APPOINTMENT (OUTPATIENT)
Dept: RADIOLOGY | Age: 69
End: 2024-04-15
Payer: MEDICARE

## 2024-04-15 ENCOUNTER — HOSPITAL ENCOUNTER (OUTPATIENT)
Dept: RADIOLOGY | Age: 69
Discharge: HOME | End: 2024-04-15
Payer: MEDICARE

## 2024-04-15 DIAGNOSIS — R19.8 INCREASED ABDOMINAL GIRTH: ICD-10-CM

## 2024-04-15 DIAGNOSIS — G89.29 CHRONIC LEFT SHOULDER PAIN: ICD-10-CM

## 2024-04-15 DIAGNOSIS — M25.512 CHRONIC LEFT SHOULDER PAIN: ICD-10-CM

## 2024-04-15 PROCEDURE — 73030 X-RAY EXAM OF SHOULDER: CPT

## 2024-04-15 PROCEDURE — 74177 CT ABD & PELVIS W/CONTRAST: CPT

## 2024-04-15 RX ORDER — IODIXANOL 320 MG/ML
100 INJECTION, SOLUTION INTRAVASCULAR
Status: COMPLETED | OUTPATIENT
Start: 2024-04-15 | End: 2024-04-15

## 2024-04-15 RX ADMIN — IODIXANOL 100 ML: 320 INJECTION, SOLUTION INTRAVASCULAR at 14:44

## 2024-04-18 DIAGNOSIS — M25.512 CHRONIC LEFT SHOULDER PAIN: Primary | ICD-10-CM

## 2024-04-18 DIAGNOSIS — G89.29 CHRONIC LEFT SHOULDER PAIN: Primary | ICD-10-CM

## 2024-05-21 DIAGNOSIS — I10 ESSENTIAL HYPERTENSION: ICD-10-CM

## 2024-05-22 ENCOUNTER — TELEPHONE (OUTPATIENT)
Dept: CARDIOLOGY CLINIC | Facility: CLINIC | Age: 69
End: 2024-05-22

## 2024-05-22 DIAGNOSIS — I10 ESSENTIAL HYPERTENSION: ICD-10-CM

## 2024-05-22 RX ORDER — AMLODIPINE BESYLATE 10 MG/1
10 TABLET ORAL DAILY
Qty: 90 TABLET | Refills: 0 | Status: SHIPPED | OUTPATIENT
Start: 2024-05-22

## 2024-05-24 RX ORDER — AMLODIPINE BESYLATE 10 MG/1
10 TABLET ORAL DAILY
Qty: 90 TABLET | Refills: 0 | OUTPATIENT
Start: 2024-05-24

## 2024-06-10 DIAGNOSIS — I10 ESSENTIAL HYPERTENSION: Primary | ICD-10-CM

## 2024-06-10 RX ORDER — LOSARTAN POTASSIUM 50 MG/1
50 TABLET ORAL DAILY
Qty: 90 TABLET | Refills: 1 | Status: SHIPPED | OUTPATIENT
Start: 2024-06-10

## 2024-06-25 DIAGNOSIS — J45.20 MILD INTERMITTENT ASTHMA WITHOUT COMPLICATION: ICD-10-CM

## 2024-06-25 RX ORDER — FLUTICASONE PROPIONATE AND SALMETEROL 500; 50 UG/1; UG/1
1 POWDER RESPIRATORY (INHALATION) 2 TIMES DAILY
Qty: 60 BLISTER | Refills: 5 | Status: SHIPPED | OUTPATIENT
Start: 2024-06-25 | End: 2024-12-22

## 2024-08-20 ENCOUNTER — APPOINTMENT (OUTPATIENT)
Dept: LAB | Age: 69
End: 2024-08-20
Payer: MEDICARE

## 2024-08-20 DIAGNOSIS — E78.2 MIXED HYPERLIPIDEMIA: ICD-10-CM

## 2024-08-20 DIAGNOSIS — R73.09 ABNORMAL GLUCOSE: ICD-10-CM

## 2024-08-20 DIAGNOSIS — I10 ESSENTIAL HYPERTENSION: ICD-10-CM

## 2024-08-20 DIAGNOSIS — Z12.5 SCREENING PSA (PROSTATE SPECIFIC ANTIGEN): ICD-10-CM

## 2024-08-20 LAB
ALBUMIN SERPL BCG-MCNC: 4.3 G/DL (ref 3.5–5)
ALP SERPL-CCNC: 56 U/L (ref 34–104)
ALT SERPL W P-5'-P-CCNC: 23 U/L (ref 7–52)
ANION GAP SERPL CALCULATED.3IONS-SCNC: 12 MMOL/L (ref 4–13)
AST SERPL W P-5'-P-CCNC: 17 U/L (ref 13–39)
BASOPHILS # BLD AUTO: 0.07 THOUSANDS/ÂΜL (ref 0–0.1)
BASOPHILS NFR BLD AUTO: 1 % (ref 0–1)
BILIRUB SERPL-MCNC: 0.57 MG/DL (ref 0.2–1)
BUN SERPL-MCNC: 14 MG/DL (ref 5–25)
CALCIUM SERPL-MCNC: 9 MG/DL (ref 8.4–10.2)
CHLORIDE SERPL-SCNC: 101 MMOL/L (ref 96–108)
CHOLEST SERPL-MCNC: 178 MG/DL
CO2 SERPL-SCNC: 27 MMOL/L (ref 21–32)
CREAT SERPL-MCNC: 1 MG/DL (ref 0.6–1.3)
EOSINOPHIL # BLD AUTO: 0.29 THOUSAND/ÂΜL (ref 0–0.61)
EOSINOPHIL NFR BLD AUTO: 4 % (ref 0–6)
ERYTHROCYTE [DISTWIDTH] IN BLOOD BY AUTOMATED COUNT: 12.7 % (ref 11.6–15.1)
EST. AVERAGE GLUCOSE BLD GHB EST-MCNC: 108 MG/DL
GFR SERPL CREATININE-BSD FRML MDRD: 76 ML/MIN/1.73SQ M
GLUCOSE P FAST SERPL-MCNC: 92 MG/DL (ref 65–99)
HBA1C MFR BLD: 5.4 %
HCT VFR BLD AUTO: 54.2 % (ref 36.5–49.3)
HDLC SERPL-MCNC: 60 MG/DL
HGB BLD-MCNC: 17.6 G/DL (ref 12–17)
IMM GRANULOCYTES # BLD AUTO: 0.02 THOUSAND/UL (ref 0–0.2)
IMM GRANULOCYTES NFR BLD AUTO: 0 % (ref 0–2)
LDLC SERPL CALC-MCNC: 78 MG/DL (ref 0–100)
LYMPHOCYTES # BLD AUTO: 1.64 THOUSANDS/ÂΜL (ref 0.6–4.47)
LYMPHOCYTES NFR BLD AUTO: 21 % (ref 14–44)
MCH RBC QN AUTO: 30.2 PG (ref 26.8–34.3)
MCHC RBC AUTO-ENTMCNC: 32.5 G/DL (ref 31.4–37.4)
MCV RBC AUTO: 93 FL (ref 82–98)
MONOCYTES # BLD AUTO: 0.75 THOUSAND/ÂΜL (ref 0.17–1.22)
MONOCYTES NFR BLD AUTO: 9 % (ref 4–12)
NEUTROPHILS # BLD AUTO: 5.18 THOUSANDS/ÂΜL (ref 1.85–7.62)
NEUTS SEG NFR BLD AUTO: 65 % (ref 43–75)
NRBC BLD AUTO-RTO: 0 /100 WBCS
PLATELET # BLD AUTO: 280 THOUSANDS/UL (ref 149–390)
PMV BLD AUTO: 9.3 FL (ref 8.9–12.7)
POTASSIUM SERPL-SCNC: 4.5 MMOL/L (ref 3.5–5.3)
PROT SERPL-MCNC: 7 G/DL (ref 6.4–8.4)
PSA SERPL-MCNC: 3.28 NG/ML (ref 0–4)
RBC # BLD AUTO: 5.82 MILLION/UL (ref 3.88–5.62)
SODIUM SERPL-SCNC: 140 MMOL/L (ref 135–147)
TRIGL SERPL-MCNC: 199 MG/DL
WBC # BLD AUTO: 7.95 THOUSAND/UL (ref 4.31–10.16)

## 2024-08-20 PROCEDURE — G0103 PSA SCREENING: HCPCS

## 2024-08-20 PROCEDURE — 85025 COMPLETE CBC W/AUTO DIFF WBC: CPT

## 2024-08-20 PROCEDURE — 83036 HEMOGLOBIN GLYCOSYLATED A1C: CPT

## 2024-08-20 PROCEDURE — 80053 COMPREHEN METABOLIC PANEL: CPT

## 2024-08-20 PROCEDURE — 80061 LIPID PANEL: CPT

## 2024-08-20 PROCEDURE — 36415 COLL VENOUS BLD VENIPUNCTURE: CPT

## 2024-08-21 DIAGNOSIS — R97.20 ABNORMAL PSA: Primary | ICD-10-CM

## 2024-08-21 DIAGNOSIS — Z12.5 ENCOUNTER FOR SCREENING FOR MALIGNANT NEOPLASM OF PROSTATE: ICD-10-CM

## 2024-09-16 DIAGNOSIS — I10 ESSENTIAL HYPERTENSION: ICD-10-CM

## 2024-09-16 DIAGNOSIS — E78.2 MIXED HYPERLIPIDEMIA: ICD-10-CM

## 2024-09-17 RX ORDER — AMLODIPINE BESYLATE 10 MG/1
10 TABLET ORAL DAILY
Qty: 90 TABLET | Refills: 0 | Status: SHIPPED | OUTPATIENT
Start: 2024-09-17

## 2024-09-18 ENCOUNTER — TELEPHONE (OUTPATIENT)
Dept: CARDIOLOGY CLINIC | Facility: CLINIC | Age: 69
End: 2024-09-18

## 2024-09-18 LAB — HBA1C MFR BLD HPLC: 5.6 %

## 2024-09-18 RX ORDER — ROSUVASTATIN CALCIUM 10 MG/1
10 TABLET, COATED ORAL DAILY
Qty: 90 TABLET | Refills: 1 | Status: SHIPPED | OUTPATIENT
Start: 2024-09-18

## 2024-10-10 ENCOUNTER — OFFICE VISIT (OUTPATIENT)
Age: 69
End: 2024-10-10
Payer: MEDICARE

## 2024-10-10 VITALS
WEIGHT: 181 LBS | OXYGEN SATURATION: 97 % | SYSTOLIC BLOOD PRESSURE: 146 MMHG | DIASTOLIC BLOOD PRESSURE: 70 MMHG | HEIGHT: 68 IN | HEART RATE: 79 BPM | TEMPERATURE: 98.3 F | BODY MASS INDEX: 27.43 KG/M2

## 2024-10-10 DIAGNOSIS — E78.2 MIXED HYPERLIPIDEMIA: Primary | ICD-10-CM

## 2024-10-10 DIAGNOSIS — Z23 ENCOUNTER FOR IMMUNIZATION: ICD-10-CM

## 2024-10-10 DIAGNOSIS — R19.8 INCREASED ABDOMINAL GIRTH: ICD-10-CM

## 2024-10-10 DIAGNOSIS — R06.83 SNORING: ICD-10-CM

## 2024-10-10 DIAGNOSIS — R06.09 DOE (DYSPNEA ON EXERTION): ICD-10-CM

## 2024-10-10 DIAGNOSIS — I10 ESSENTIAL HYPERTENSION: ICD-10-CM

## 2024-10-10 DIAGNOSIS — J45.20 MILD INTERMITTENT ASTHMA WITHOUT COMPLICATION: ICD-10-CM

## 2024-10-10 DIAGNOSIS — N20.0 RENAL CALCULI: ICD-10-CM

## 2024-10-10 DIAGNOSIS — Z00.00 ENCOUNTER FOR ANNUAL WELLNESS VISIT (AWV) IN MEDICARE PATIENT: ICD-10-CM

## 2024-10-10 PROCEDURE — G0008 ADMIN INFLUENZA VIRUS VAC: HCPCS | Performed by: FAMILY MEDICINE

## 2024-10-10 PROCEDURE — 90662 IIV NO PRSV INCREASED AG IM: CPT | Performed by: FAMILY MEDICINE

## 2024-10-10 PROCEDURE — G0439 PPPS, SUBSEQ VISIT: HCPCS | Performed by: FAMILY MEDICINE

## 2024-10-10 PROCEDURE — 99214 OFFICE O/P EST MOD 30 MIN: CPT | Performed by: FAMILY MEDICINE

## 2024-10-10 RX ORDER — OLMESARTAN MEDOXOMIL 20 MG/1
20 TABLET ORAL DAILY
COMMUNITY
Start: 2024-09-16

## 2024-10-10 RX ORDER — TAMSULOSIN HYDROCHLORIDE 0.4 MG/1
0.4 CAPSULE ORAL
Qty: 100 CAPSULE | Refills: 3 | Status: SHIPPED | OUTPATIENT
Start: 2024-10-10

## 2024-10-10 NOTE — PROGRESS NOTES
Ambulatory Visit  Name: Harinder Ventura      : 1955      MRN: [id number]  Encounter Provider: Noelle Early DO  Encounter Date: 10/10/2024   Encounter department: On license of [facility] [place]    Assessment & Plan       Preventive health issues were discussed with patient, and age appropriate screening tests were ordered as noted in patient's After Visit Summary. Personalized health advice and appropriate referrals for health education or preventive services given if needed, as noted in patient's After Visit Summary.    History of Present Illness     HPI   Patient Care Team:  Noelle Early DO as PCP - General  NITA Camargo    Review of Systems  Medical History Reviewed by provider this encounter:       Annual Wellness Visit Questionnaire   Harinder is here for his Subsequent Wellness visit.     Health Risk Assessment:   Patient rates overall health as good. Patient feels that their physical health rating is same. Patient is satisfied with their life. Eyesight was rated as same. Hearing was rated as same. Patient feels that their emotional and mental health rating is same. Patients states they are sometimes angry. Patient states they are sometimes unusually tired/fatigued. Pain experienced in the last 7 days has been some. Patient's pain rating has been 4/10. Patient states that he has experienced no weight loss or gain in last 6 months.     Depression Screening:   PHQ-2 Score: 0      Fall Risk Screening:   In the past year, patient has experienced: no history of falling in past year      Home Safety:  Patient does not have trouble with stairs inside or outside of their home. Patient has working smoke alarms and has working carbon monoxide detector. Home safety hazards include: none.     Nutrition:   Current diet is Regular.     Medications:   Patient is not currently taking any over-the-counter supplements. Patient is able to manage medications.     Activities of Daily Living  (ADLs)/Instrumental Activities of Daily Living (IADLs):   Walk and transfer into and out of bed and chair?: Yes  Dress and groom yourself?: Yes    Bathe or shower yourself?: Yes    Feed yourself? Yes  Do your laundry/housekeeping?: Yes  Manage your money, pay your bills and track your expenses?: Yes  Make your own meals?: Yes    Do your own shopping?: Yes    Previous Hospitalizations:   Any hospitalizations or ED visits within the last 12 months?: No      Advance Care Planning:   Living will: Yes    Durable POA for healthcare: Yes    Advanced directive: Yes      PREVENTIVE SCREENINGS      Cardiovascular Screening:    General: Screening Not Indicated and History Lipid Disorder      Diabetes Screening:     General: Screening Current      Colorectal Cancer Screening:     General: Screening Current      Prostate Cancer Screening:    General: Screening Current      Abdominal Aortic Aneurysm (AAA) Screening:    Risk factors include: age between 65-76 yo and tobacco use        Lung Cancer Screening:     General: Screening Not Indicated      Hepatitis C Screening:    General: Screening Current    Screening, Brief Intervention, and Referral to Treatment (SBIRT)    Screening  Typical number of drinks in a day: 1  Typical number of drinks in a week: 4  Interpretation: Low risk drinking behavior.    AUDIT-C Screenin) How often did you have a drink containing alcohol in the past year? 2 to 3 times a week  2) How many drinks did you have on a typical day when you were drinking in the past year? 1 to 2  3) How often did you have 6 or more drinks on one occasion in the past year? never    AUDIT-C Score: 3  Interpretation: Score 0-3 (male): Negative screen for alcohol misuse    Single Item Drug Screening:  How often have you used an illegal drug (including marijuana) or a prescription medication for non-medical reasons in the past year? never    Single Item Drug Screen Score: 0  Interpretation: Negative screen for possible drug  use disorder    Social Determinants of Health     Financial Resource Strain: Low Risk  (10/3/2023)    Overall Financial Resource Strain (CARDIA)     Difficulty of Paying Living Expenses: Not hard at all   Food Insecurity: No Food Insecurity (10/3/2024)    Hunger Vital Sign     Worried About Running Out of Food in the Last Year: Never true     Ran Out of Food in the Last Year: Never true   Transportation Needs: No Transportation Needs (10/3/2024)    PRAPARE - Transportation     Lack of Transportation (Medical): No     Lack of Transportation (Non-Medical): No   Housing Stability: Unknown (10/3/2024)    Housing Stability Vital Sign     Unable to Pay for Housing in the Last Year: No     Homeless in the Last Year: No   Utilities: Not At Risk (10/3/2024)    [facility] Utilities     Threatened with loss of utilities: No     No results found.    Objective     There were no vitals taken for this visit.    Physical Exam

## 2024-10-10 NOTE — PROGRESS NOTES
Assessment/Plan:    1. Encounter for immunization  -     influenza vaccine, high-dose, PF 0.5 mL (Fluzone High Dose)  2. Renal calculi  -     tamsulosin (FLOMAX) 0.4 mg; Take 1 capsule (0.4 mg total) by mouth daily with dinner  3. Mixed hyperlipidemia  4. Essential hypertension  5. Mild intermittent asthma without complication  6. LAFLEUR (dyspnea on exertion)  7. Increased abdominal girth  8. Snoring  -     Ambulatory Referral to Sleep Medicine; Future      Discussed RSV vaccine     There are no Patient Instructions on file for this visit.    No follow-ups on file.    Subjective:      Patient ID: Harinder Ventura is a 68 y.o. male.    Chief Complaint   Patient presents with    Follow-up     6 m f/u visit for hypertension, review labs done 8/20/24.  Patient would like Dr Early to refill the Amlodipine instead of Cardiology.   Received a flu vaccine.    Medicare Wellness Visit     Subsequent AWV       HPI        Shortness of breath, patient was taken off lisinopril and feels that his shortness of breath has significantly improved.  Had repeat PFT which is in his chart and essentially unchanged from previous with moderate obstructive disease.  He was increased for from 250 mg Advair to 500 but notices no improvement with that.  He would like to go back to 250 mg.  He saw Cardiology who increased his amlodipine however his blood pressure still remains high.  Patient denies any chest pain or shortness of breath.  March 2023, no significant improvement, no weight gain since last visit but over the 2 years has put on about 6 or 7 pounds which are central, he feels that this is affecting his breathing especially when he bends down due to asthma  July 2023, still feels that he is not any better and has decreased his daily workload due to the  April 2024: stable but not at baseline. Still has LAFLEUR, has had several medications and several tests without any relief.  Has difficulty bending down and tying his shoes as he gets short of  breath.  Has difficulty on the treadmill as he gets short of breath.  Has completed all testing except for any abdominal testing that has not been ordered and he has not had a sleep study done he does snore at night.  Patient is following up with cardiology and has multiple testing done again since a few years ago.  He feels disappointed as he has not been as active or been able to do the things that he could before and he is dealing with a large abdominal area  October 2024, extensive workup unrevealing for any issues, following with cardiology and pulmonary as well        Asthma, intermittent, has some wheezing more so from his throat on a regular basis.  Only uses Advair once a day even though he knows he is supposed to do it twice a day.  Not on antihistamine or mast cell stabilizer.  Uses albuterol inhaler every single morning once a day.  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  He does not have baseline shortness of breath.  July 2021, had PFTs done in January which showed FEV1 of 56%.  At 1 point he has severe asthma and was on Advair 500 mg and currently is on 250. His FEV1 to use to be 46% according to him.  His last PFT was done many many years ago.  No recent exacerbations requiring ER visits hospitalizations or prednisone  Sept 2021: stable, no exacerbations, has had LAFLEUR with heavy lifting in the yard  September 2022, has been having dyspnea on exertion and decreased endurance this year.  Was seen in December for similar symptoms and workup was negative.  Since that time patient states he has not gotten any better and he cannot go up 1-2 flights of steps without stopping to catch his breath which is completely abnormal for him.  He had a drug-eluting stent placed but did not have his summer follow-up due to not being able to contact the office.  He feels his difficulties are from lisinopril and Brilinta and he would like both changed.   Patient states he did stop them for 2 days until better.  He states his abdomen has been getting larger without any change in weight.  He denies constipation nausea vomiting or bloating.  He is not having any wheezing at home and at rest he has no shortness of breath.  He has not needed his rescue inhaler but and he is compliant with his medications.  March 2023, no recent exacerbations  July 2023, feels that he is not any better overall, following with cardiology and is not able to do his heavy workload and physical activity around the house as previous   April 2024: stable but not at baseline. Still has LAFLEUR, has had several medications and several tests without any relief  October 2024, following with pulmonary and cardiology, still having dyspnea on exertion but no progression        Essential hypertension, on amlodipine, increase most recently by Cardiology.  Blood pressure readings are significantly elevated.  He feels that Brilinta may be contributing since he usually has some strange side effects however he only has 6 weeks left until completion of his full course and then he will continue on aspirin.  Patient would like to revisit metoprolol since originally with all his fatigue was from that however since stopping lisinopril his fatigue has largely improved.   march 2023: Improved since seeing cardiology, was placed on verapamil but after 3 days had side effects.  Taken off metoprolol, now started on Bystolic which has been working well and he is tolerating it.  Home blood pressure readings are very well controlled  April 2024, Bystolic removed by cardiology due to history of asthma however it was very well helping with his blood pressure.  Patient now on amlodipine and losartan which controlled his systolic to the 140s at home.  He is not happy with his numbers as previously was better.  He is following regularly with cardiology and has had cardiac testing done  October 2024, blood pressure now controlled  at home with medications, following with cardiology and has had extensive testing done     Hyperlipidemia, currently not on medications.  His cholesterol does fluctuate from time to time.  He does have family history but he eats very well and needs approximately 5-6 very small meals throughout the day on a regular basis.  July 2021, last LDL remains elevated.  Patient not on statin and did not get his stress test as recommended.  He was concerned with the dobutamine and is asthma.  He is requesting alternative testing.  He has significant early family history in his father who had 3 acute MI eyes and then underwent chelation therapy which was beneficial to him.  Patient occasionally has intermittent chest discomfort but nothing that lasts or makes him worried to go to the emergency room.  He is very active and works and is yard   Sept 2021: had labs done, improved lipids, not on meds, Ct calcium score Is resulted he is here for results  September 2022, last lab work done in December showed normal levels.  Due for lab work now.    April 2024, normal levels, on low-dose statin per cardiology.  Feels fatigue with taking the medications but he is compliant  October 2024, lipid levels are in range, tolerating statin     Drug-eluting stent placed less than 1 year ago.  Was on Plavix it was making him tired with metoprolol and both were changed to Brilinta and lisinopril.  He feels that he is not any better and his dyspnea on exertion and fatigue and lack of endurance or due to these medications.  He did not have a 6 month follow-up scheduled by Cardiology and now it is at 9 months.  April 2024, follows with cardiology and had multiple testing done which was normal.  Now on Crestor with normal lipid levels however he feels he is having side effects of Crestor.         Answers submitted by the patient for this visit:  Medicare Annual Wellness Visit (Submitted on 10/3/2024)  How would you rate your overall health?:  "good  Compared to last year, how is your physical health?: same  In general, how satisfied are you with your life?: satisfied  Compared to last year, how is your eyesight?: same  Compared to last year, how is your hearing?: same  Compared to last year, how is your emotional/mental health?: same  How often is anger a problem for you?: sometimes  How often do you feel unusually tired/fatigued?: sometimes  In the past 7 days, how much pain have you experienced?: some  If you answered \"some\" or \"a lot\", please rate the severity of your pain on a scale of 1 to 10 (1 being the least severe pain and 10 being the most intense pain).: 4/10  In the past 6 months, have you lost or gained 10 pounds without trying?: No  One or more falls in the last year: No  Do you have trouble with the stairs inside or outside your home?: No  Does your home have working smoke alarms?: Yes  Does your home have a carbon monoxide monitor?: Yes  Which safety hazards (if any) have you experienced in your home? Please select all that apply.: none  How would you describe your current diet? Please select all that apply.: Regular  In addition to prescription medications, are you taking any over-the-counter supplements?: No  Can you manage your medications?: Yes  Are you currently taking any opioid medications?: No  Can you walk and transfer into and out of your bed and chair?: Yes  Can you dress and groom yourself?: Yes  Can you bathe or shower yourself?: Yes  Can you feed yourself?: Yes  Can you do your laundry/ housekeeping?: Yes  Can you manage your money, pay your bills, and track your expenses?: Yes  Can you make your own meals?: Yes  Can you do your own shopping?: Yes  Within the last 12 months, have you had any hospitalizations or Emergency Department visits?: No  Do you have a living will?: Yes  Do you have a Durable POA (Power of ) for healthcare decisions?: Yes  Do you have an Advanced Directive for end of life decisions?: Yes  How " often have you used an illegal drug (including marijuana) or a prescription medication for non-medical reasons in the past year?: never  What is the typical number of drinks you consume in a day?: 1  What is the typical number of drinks you consume in a week?: 4  How often did you have a drink containing alcohol in the past year?: 2 to 3 times a week  How many drinks did you have on a typical day  when you were drinking in the past year?: 1 to 2  How often did you have 6 or more drinks on one occasion in the past year?: never        The following portions of the patient's history were reviewed and updated as appropriate: allergies, current medications, past family history, past medical history, past social history, past surgical history and problem list.    Review of Systems      Constitutional:  Denies fever or chills   Eyes:  Denies double , blurry vision or eye pain  HENT:  Denies nasal congestion, sore throat or new hearing issues  Respiratory:  Denies cough or shortness of breath or wheezing  Cardiovascular:  Denies palpitations or chest pain  GI:  Denies abdominal pain, nausea, or vomiting, no loose stools, no reflux  Integument:  Denies rash , no open areas  Neurologic:  Denies headache or focal weakness, no dizziness  : no dysuria, or hematuria        Current Outpatient Medications   Medication Sig Dispense Refill    amLODIPine (NORVASC) 10 mg tablet Take 1 tablet (10 mg total) by mouth daily 90 tablet 0    aspirin 81 mg chewable tablet Chew 1 tablet (81 mg total) daily 30 tablet 0    ezetimibe (ZETIA) 10 mg tablet Take 10 mg by mouth daily      Fluticasone-Salmeterol (Advair Diskus) 500-50 mcg/dose inhaler Inhale 1 puff 2 (two) times a day Rinse mouth after use. 60 blister 5    olmesartan (BENICAR) 20 mg tablet Take 20 mg by mouth daily      rosuvastatin (CRESTOR) 10 MG tablet Take 1 tablet (10 mg total) by mouth daily 90 tablet 1    tamsulosin (FLOMAX) 0.4 mg Take 1 capsule (0.4 mg total) by mouth daily  "with dinner 100 capsule 3    albuterol (ProAir HFA) 90 mcg/act inhaler Inhale 2 puffs every 6 (six) hours as needed for wheezing 18 g 2     No current facility-administered medications for this visit.       Objective:    /70 (BP Location: Left arm, Patient Position: Sitting, Cuff Size: Standard)   Pulse 79   Temp 98.3 °F (36.8 °C) (Temporal)   Ht 5' 7.5\" (1.715 m)   Wt 82.1 kg (181 lb)   SpO2 97%   BMI 27.93 kg/m²        Physical Exam      Constitutional:  Well developed, well nourished, no acute distress, non-toxic appearance   Eyes:  PERRL, conjunctiva normal , non icteric sclera  HENT:  Atraumatic, oropharynx moist. Neck-  supple   Respiratory:  CTA b/l, normal breath sounds, no rales, no wheezing   Cardiovascular:  RRR, no murmurs, no LE edema b/l  GI:  Soft, nondistended, normal bowel sounds x 4, nontender, no organomegaly, no mass, no rebound, no guarding   Neurologic:  no focal deficits noted   Psychiatric:  Speech and behavior appropriate , AAO x 3      Noelle Early DO    "

## 2024-10-14 ENCOUNTER — TRANSCRIBE ORDERS (OUTPATIENT)
Dept: SLEEP CENTER | Facility: CLINIC | Age: 69
End: 2024-10-14

## 2024-10-14 DIAGNOSIS — G47.39 OTHER SLEEP APNEA: Primary | ICD-10-CM

## 2024-10-14 DIAGNOSIS — R06.83 SNORING: ICD-10-CM

## 2024-11-04 ENCOUNTER — PATIENT MESSAGE (OUTPATIENT)
Age: 69
End: 2024-11-04

## 2024-11-13 DIAGNOSIS — J45.20 MILD INTERMITTENT ASTHMA WITHOUT COMPLICATION: ICD-10-CM

## 2024-11-14 RX ORDER — ALBUTEROL SULFATE 90 UG/1
2 INHALANT RESPIRATORY (INHALATION) EVERY 6 HOURS PRN
Qty: 18 G | Refills: 2 | Status: SHIPPED | OUTPATIENT
Start: 2024-11-14

## 2024-12-15 PROBLEM — Z00.00 ENCOUNTER FOR ANNUAL WELLNESS VISIT (AWV) IN MEDICARE PATIENT: Status: RESOLVED | Noted: 2023-10-03 | Resolved: 2024-12-15

## 2024-12-16 DIAGNOSIS — I10 ESSENTIAL HYPERTENSION: ICD-10-CM

## 2024-12-18 DIAGNOSIS — I10 ESSENTIAL HYPERTENSION: ICD-10-CM

## 2024-12-18 RX ORDER — AMLODIPINE BESYLATE 10 MG/1
10 TABLET ORAL DAILY
Qty: 90 TABLET | Refills: 0 | Status: SHIPPED | OUTPATIENT
Start: 2024-12-18 | End: 2024-12-18 | Stop reason: SDUPTHER

## 2024-12-19 RX ORDER — AMLODIPINE BESYLATE 10 MG/1
10 TABLET ORAL DAILY
Qty: 100 TABLET | Refills: 1 | Status: SHIPPED | OUTPATIENT
Start: 2024-12-19

## 2024-12-30 ENCOUNTER — APPOINTMENT (OUTPATIENT)
Dept: LAB | Age: 69
End: 2024-12-30
Payer: MEDICARE

## 2024-12-30 DIAGNOSIS — Z79.899 NEED FOR PROPHYLACTIC CHEMOTHERAPY: ICD-10-CM

## 2024-12-30 DIAGNOSIS — E78.5 HYPERLIPIDEMIA, UNSPECIFIED HYPERLIPIDEMIA TYPE: ICD-10-CM

## 2024-12-30 DIAGNOSIS — E78.1 PURE HYPERGLYCERIDEMIA: ICD-10-CM

## 2024-12-30 LAB
ANION GAP SERPL CALCULATED.3IONS-SCNC: 9 MMOL/L (ref 4–13)
BNP SERPL-MCNC: 30 PG/ML (ref 0–100)
BUN SERPL-MCNC: 28 MG/DL (ref 5–25)
CALCIUM SERPL-MCNC: 9.3 MG/DL (ref 8.4–10.2)
CHLORIDE SERPL-SCNC: 104 MMOL/L (ref 96–108)
CO2 SERPL-SCNC: 27 MMOL/L (ref 21–32)
CREAT SERPL-MCNC: 1.22 MG/DL (ref 0.6–1.3)
GFR SERPL CREATININE-BSD FRML MDRD: 59 ML/MIN/1.73SQ M
GLUCOSE SERPL-MCNC: 116 MG/DL (ref 65–140)
POTASSIUM SERPL-SCNC: 4 MMOL/L (ref 3.5–5.3)
SODIUM SERPL-SCNC: 140 MMOL/L (ref 135–147)

## 2024-12-30 PROCEDURE — 83880 ASSAY OF NATRIURETIC PEPTIDE: CPT

## 2024-12-30 PROCEDURE — 80048 BASIC METABOLIC PNL TOTAL CA: CPT

## 2024-12-30 PROCEDURE — 36415 COLL VENOUS BLD VENIPUNCTURE: CPT

## 2025-02-27 DIAGNOSIS — E78.2 MIXED HYPERLIPIDEMIA: Primary | ICD-10-CM

## 2025-02-28 RX ORDER — EZETIMIBE 10 MG/1
10 TABLET ORAL DAILY
Qty: 90 TABLET | Refills: 1 | Status: SHIPPED | OUTPATIENT
Start: 2025-02-28

## 2025-04-15 ENCOUNTER — OFFICE VISIT (OUTPATIENT)
Dept: INTERNAL MEDICINE CLINIC | Facility: OTHER | Age: 70
End: 2025-04-15
Payer: MEDICARE

## 2025-04-22 PROCEDURE — 88305 TISSUE EXAM BY PATHOLOGIST: CPT | Performed by: PATHOLOGY

## 2025-04-25 PROCEDURE — 88305 TISSUE EXAM BY PATHOLOGIST: CPT | Performed by: PATHOLOGY

## 2025-07-21 DIAGNOSIS — E78.2 MIXED HYPERLIPIDEMIA: ICD-10-CM

## 2025-07-21 DIAGNOSIS — I10 ESSENTIAL HYPERTENSION: ICD-10-CM

## 2025-07-23 RX ORDER — ROSUVASTATIN CALCIUM 10 MG/1
10 TABLET, COATED ORAL DAILY
Qty: 90 TABLET | Refills: 0 | OUTPATIENT
Start: 2025-07-23

## 2025-07-23 RX ORDER — ROSUVASTATIN CALCIUM 10 MG/1
10 TABLET, COATED ORAL DAILY
Qty: 90 TABLET | Refills: 1 | Status: SHIPPED | OUTPATIENT
Start: 2025-07-23

## 2025-07-23 RX ORDER — AMLODIPINE BESYLATE 10 MG/1
10 TABLET ORAL DAILY
Qty: 100 TABLET | Refills: 1 | Status: SHIPPED | OUTPATIENT
Start: 2025-07-23

## (undated) DEVICE — TR BAND RADIAL ARTERY COMPRESSION DEVICE: Brand: TR BAND

## (undated) DEVICE — TREK CORONARY DILATATION CATHETER 3.0 MM X 15 MM / RAPID-EXCHANGE: Brand: TREK

## (undated) DEVICE — GLIDESHEATH BASIC HYDROPHILIC COATED INTRODUCER SHEATH: Brand: GLIDESHEATH

## (undated) DEVICE — NC TREK CORONARY DILATATION CATHETER 3.5 MM X 15 MM / RAPID-EXCHANGE: Brand: NC TREK

## (undated) DEVICE — RADIFOCUS OPTITORQUE ANGIOGRAPHIC CATHETER: Brand: OPTITORQUE

## (undated) DEVICE — GUIDEWIRE WHOLEY .035 145 CM FLOP STR TIP

## (undated) DEVICE — RUNTHROUGH NS EXTRA FLOPPY PTCA GUIDEWIRE: Brand: RUNTHROUGH

## (undated) DEVICE — CATH GUIDE LAUNCHER 6FR EBU 3.5

## (undated) DEVICE — GUIDEWIRE .035 260CM 3MM J TIP